# Patient Record
Sex: FEMALE | Race: WHITE | NOT HISPANIC OR LATINO | Employment: OTHER | URBAN - METROPOLITAN AREA
[De-identification: names, ages, dates, MRNs, and addresses within clinical notes are randomized per-mention and may not be internally consistent; named-entity substitution may affect disease eponyms.]

---

## 2018-02-21 ENCOUNTER — HOSPITAL ENCOUNTER (EMERGENCY)
Facility: HOSPITAL | Age: 43
Discharge: HOME | End: 2018-02-21
Attending: EMERGENCY MEDICINE
Payer: MEDICARE

## 2018-02-21 VITALS
BODY MASS INDEX: 31.96 KG/M2 | WEIGHT: 198 LBS | TEMPERATURE: 98 F | SYSTOLIC BLOOD PRESSURE: 105 MMHG | RESPIRATION RATE: 20 BRPM | OXYGEN SATURATION: 100 % | HEART RATE: 58 BPM | DIASTOLIC BLOOD PRESSURE: 57 MMHG

## 2018-02-21 DIAGNOSIS — S00.83XA CONTUSION OF FACE, INITIAL ENCOUNTER: Primary | ICD-10-CM

## 2018-02-21 PROCEDURE — 99284 EMERGENCY DEPT VISIT MOD MDM: CPT

## 2018-02-21 RX ORDER — HYDROCORTISONE 5 MG/1
5 TABLET ORAL DAILY
Status: ON HOLD | COMMUNITY
End: 2019-10-22

## 2018-02-21 RX ORDER — HALOPERIDOL 10 MG/1
10 TABLET ORAL 2 TIMES DAILY
Status: ON HOLD | COMMUNITY
End: 2019-10-23 | Stop reason: HOSPADM

## 2018-02-21 RX ORDER — HYDROCORTISONE 10 MG/1
10 TABLET ORAL DAILY
Status: ON HOLD | COMMUNITY
End: 2019-10-22

## 2018-02-21 RX ORDER — TRAZODONE HYDROCHLORIDE 100 MG/1
100 TABLET ORAL NIGHTLY
Status: ON HOLD | COMMUNITY
End: 2019-10-23 | Stop reason: HOSPADM

## 2018-02-21 RX ORDER — RISPERIDONE 1 MG/1
1.5 TABLET, ORALLY DISINTEGRATING ORAL DAILY
Status: ON HOLD | COMMUNITY
End: 2019-10-22 | Stop reason: SDUPTHER

## 2018-02-21 RX ORDER — LEVOTHYROXINE SODIUM 75 UG/1
75 TABLET ORAL DAILY
COMMUNITY

## 2018-02-21 RX ORDER — RISPERIDONE 2 MG/1
0.5 TABLET ORAL 2 TIMES DAILY
Status: ON HOLD | COMMUNITY
End: 2019-10-25 | Stop reason: SDUPTHER

## 2018-02-21 RX ORDER — LORAZEPAM 2 MG/1
2 TABLET ORAL NIGHTLY
Status: ON HOLD | COMMUNITY
End: 2019-10-22

## 2018-02-21 NOTE — DISCHARGE INSTRUCTIONS
__________________    No broken bones or other serious injury.    Routine ice packs/ Tylenol/ Motrin/ etc.    ______________

## 2018-02-21 NOTE — ED PROVIDER NOTES
Encounter Date: 2/21/2018       History     Chief Complaint   Patient presents with    Fall     fell in shower last night . left eye with bruising.     Lives at a care facility, generally has assistance with taking a shower, apparently took a shower on her own last night and slipped, striking her face.  There was no loss of consciousness.  No laceration or bleeding.  Night staff tended to her, and she did not have any other injury or complaints besides bruising and tenderness in the left infraorbital region.  Here today for further evaluation as per supervisor.  No change in behavior.  She has known mild strabismus which is at her baseline according to the caregiver.  She has moderate MR and other conditions as outlined, staff confirms that she is behaving normally.  No headache, neck pain, vomiting, change in level of consciousness, or other complaints.      The history is provided by the patient and a caregiver. No  was used.     Review of patient's allergies indicates:   Allergen Reactions    Tofranil [imipramine hcl]      Past Medical History:   Diagnosis Date    Autistic disorder     IDDM (insulin dependent diabetes mellitus)     Mental retardation     Tourette disease      History reviewed. No pertinent surgical history.  History reviewed. No pertinent family history.  Social History   Substance Use Topics    Smoking status: Never Smoker    Smokeless tobacco: Never Used    Alcohol use No     Review of Systems   Constitutional: Negative for activity change, fatigue and fever.   HENT: Positive for facial swelling. Negative for congestion, ear pain, nosebleeds, sinus pressure and sore throat.    Eyes: Negative for pain, discharge, redness and visual disturbance.   Respiratory: Negative for cough, choking, chest tightness, shortness of breath and wheezing.    Cardiovascular: Negative for chest pain, palpitations and leg swelling.   Gastrointestinal: Negative for abdominal distention,  abdominal pain, nausea and vomiting.   Endocrine: Negative for heat intolerance, polydipsia and polyuria.   Genitourinary: Negative for difficulty urinating, dysuria, flank pain, hematuria and urgency.   Musculoskeletal: Negative for back pain, gait problem, joint swelling and myalgias.   Skin: Negative for color change and rash.   Allergic/Immunologic: Negative for environmental allergies and food allergies.   Neurological: Negative for dizziness, weakness, numbness and headaches.   Hematological: Negative for adenopathy. Does not bruise/bleed easily.   Psychiatric/Behavioral: Negative for agitation and behavioral problems. The patient is not nervous/anxious.    All other systems reviewed and are negative.      Physical Exam     Initial Vitals [02/21/18 0926]   BP Pulse Resp Temp SpO2   (!) 105/57 (!) 58 20 98.2 °F (36.8 °C) 100 %      MAP       73         Physical Exam    Nursing note and vitals reviewed.  Constitutional: She appears well-developed and well-nourished. She is not diaphoretic. No distress.   HENT:   Head: Normocephalic.   Mouth/Throat: No oropharyngeal exudate.   Moderate tenderness, slight swelling, and ecchymosis in the left infraorbital region without adelita deformity.  Tympanic membranes and ear canals are clear.  Extraocular eye movements are intact, no sign of entrapment although she does have some mild strabismus which is at her baseline as per caregiver.  No other findings.   Eyes: Conjunctivae and EOM are normal. Pupils are equal, round, and reactive to light. Right eye exhibits no discharge. Left eye exhibits no discharge. No scleral icterus.   Mild strabismus at baseline per caregiver - left eye with mild left lateral deviation   Neck: Normal range of motion. Neck supple. No thyromegaly present. No tracheal deviation present. No JVD present.   Cardiovascular: Normal rate, regular rhythm, normal heart sounds and intact distal pulses. Exam reveals no gallop and no friction rub.    No murmur  heard.  Pulmonary/Chest: Breath sounds normal. No stridor. No respiratory distress. She has no wheezes. She has no rhonchi. She has no rales. She exhibits no tenderness.   Abdominal: Soft. Bowel sounds are normal. She exhibits no distension and no mass. There is no tenderness. There is no rebound and no guarding.   Musculoskeletal: Normal range of motion. She exhibits no edema or tenderness.   Neurological: She is alert and oriented to person, place, and time. She has normal strength.   Skin: Skin is warm and dry. No rash and no abscess noted. No erythema.   Psychiatric: She has a normal mood and affect. Her behavior is normal. Judgment and thought content normal.   Calm, flat, moderate MR, cooperative, non-localizing exam         ED Course   Procedures  Labs Reviewed - No data to display     Imaging Results          CT Head Without Contrast (Final result)  Result time 02/21/18 11:50:23    Final result by KELLEY Harrell Sr., MD (02/21/18 11:50:23)                 Impression:      1. There is no calvarial fracture or intracranial hemorrhage.  2. There is mild mucosal sinus thickening in the left maxillary sinus. It has a thickness of 3 mm.        All CT scans at this facility use dose modulation, iterative reconstruction, and/or weight base dosing when appropriate to reduce radiation dose when appropriate to reduce radiation dose to as low as reasonably achievable.      Electronically signed by: KELLEY HARRELL MD  Date:     02/21/18  Time:    11:50              Narrative:    CT of Head without IV contrast    History:     Headache status post trauma    Technique: Standard brain CT protocol without IV contrast was performed.     Finding: The ventricles have a normal size, position, and appearance. There is no abnormal intracranial mass or intracranial hemorrhage. There is no skull fracture. There is mild mucosal sinus thickening in the left maxillary sinus. It has a thickness of 3 mm.                             CT  Maxillofacial Without Contrast (Final result)  Result time 02/21/18 12:03:08    Final result by KELLEY Harrell Sr., MD (02/21/18 12:03:08)                 Impression:      1. There is a mild amount of edema in the soft tissue inferior to the left orbit. This is consistent with the patient's history and characteristic of a soft tissue contusion.  2. There is moderate deviation to the right of the nasal septum.   3. There is mild mucosal sinus thickening in the maxillary sinuses bilaterally. The thickening in the left maxillary sinus measures 5 mm.      All CT scans at this facility use dose modulation, iterative reconstruction, and/or weight base dosing when appropriate to reduce radiation dose when appropriate to reduce radiation dose to as low as reasonably achievable.      Electronically signed by: KELLEY HARRELL MD  Date:     02/21/18  Time:    12:03              Narrative:    CT of the facial bones without IV contrast    Clinical History: Facial pain status post trauma    Technique: Standard paranasal sinus CT protocol without IV contrast material was performed     Finding: There is a mild amount of edema in the soft tissue inferior to the left orbit. There is no fracture or dislocation. There is moderate deviation to the right of the nasal septum. The ostiomeatal complexes are patent bilaterally. There is mild mucosal sinus thickening in the maxillary sinuses bilaterally. The thickening in the left maxillary sinus measures 5 mm.                             CT Cervical Spine Without Contrast (Final result)  Result time 02/21/18 11:54:10    Final result by KELLEY Harrell Sr., MD (02/21/18 11:54:10)                 Impression:         There is a mild amount of levoconvex curvature of the cervical spine.        All CT scans at this facility use dose modulation, iterative reconstruction, and/or weight base dosing when appropriate to reduce radiation dose when appropriate to reduce radiation dose to as low as  reasonably achievable.      Electronically signed by: KELLEY QUIGLEY MD  Date:     02/21/18  Time:    11:54              Narrative:    CT of c-spine without IV contrast    History: Neck pain    Technique: Standard cervical spine CT protocol was performed without IV contrast.    Finding: There is a mild amount of levoconvex curvature of the cervical spine. There is no fracture or spondylolisthesis. There is normal cervical lordosis. The prevertebral soft tissue space is normal in appearance.                            12:11 PM Perfectly stable.                          Clinical Impression:     1. Contusion of face, initial encounter            Disposition:   Disposition: Discharged  Condition: Stable                        David Oviedo MD  02/21/18 9290

## 2018-02-22 ENCOUNTER — PES CALL (OUTPATIENT)
Dept: ADMINISTRATIVE | Facility: CLINIC | Age: 43
End: 2018-02-22

## 2019-10-19 ENCOUNTER — HOSPITAL ENCOUNTER (INPATIENT)
Facility: HOSPITAL | Age: 44
LOS: 4 days | Discharge: HOME HEALTH | DRG: 563 | End: 2019-10-25
Attending: EMERGENCY MEDICINE | Admitting: INTERNAL MEDICINE
Payer: MEDICARE

## 2019-10-19 DIAGNOSIS — T68.XXXA HYPOTHERMIA, INITIAL ENCOUNTER: ICD-10-CM

## 2019-10-19 DIAGNOSIS — R00.1 BRADYCARDIA: ICD-10-CM

## 2019-10-19 DIAGNOSIS — Z79.899 POLYPHARMACY: ICD-10-CM

## 2019-10-19 DIAGNOSIS — S42.292D OTHER CLOSED DISPLACED FRACTURE OF PROXIMAL END OF LEFT HUMERUS WITH ROUTINE HEALING, SUBSEQUENT ENCOUNTER: Primary | ICD-10-CM

## 2019-10-19 DIAGNOSIS — R41.82 ALTERED MENTAL STATUS, UNSPECIFIED ALTERED MENTAL STATUS TYPE: ICD-10-CM

## 2019-10-19 DIAGNOSIS — S42.292A OTHER CLOSED DISPLACED FRACTURE OF PROXIMAL END OF LEFT HUMERUS, INITIAL ENCOUNTER: ICD-10-CM
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
## 2019-10-19 DIAGNOSIS — M79.602 LEFT ARM PAIN: ICD-10-CM

## 2019-10-19 DIAGNOSIS — R53.83 LETHARGY: ICD-10-CM

## 2019-10-19 PROBLEM — D69.6 THROMBOCYTOPENIA: Status: ACTIVE | Noted: 2019-10-19

## 2019-10-19 PROBLEM — S42.402A CLOSED FRACTURE OF DISTAL END OF LEFT HUMERUS: Status: ACTIVE | Noted: 2019-10-19

## 2019-10-19 PROBLEM — S42.202A CLOSED FRACTURE OF PROXIMAL END OF LEFT HUMERUS: Status: ACTIVE | Noted: 2019-10-19

## 2019-10-19 PROBLEM — E03.9 HYPOTHYROIDISM: Status: ACTIVE | Noted: 2019-10-19

## 2019-10-19 LAB
ALBUMIN SERPL BCP-MCNC: 2.8 G/DL (ref 3.5–5.2)
ALP SERPL-CCNC: 78 U/L (ref 55–135)
ALT SERPL W/O P-5'-P-CCNC: 41 U/L (ref 10–44)
ANION GAP SERPL CALC-SCNC: 6 MMOL/L (ref 8–16)
AST SERPL-CCNC: 32 U/L (ref 10–40)
BASOPHILS # BLD AUTO: 0 K/UL (ref 0–0.2)
BASOPHILS # BLD AUTO: 0.01 K/UL (ref 0–0.2)
BASOPHILS NFR BLD: 0 % (ref 0–1.9)
BASOPHILS NFR BLD: 0.2 % (ref 0–1.9)
BILIRUB SERPL-MCNC: 0.3 MG/DL (ref 0.1–1)
BILIRUB UR QL STRIP: NEGATIVE
BUN SERPL-MCNC: 27 MG/DL (ref 6–20)
CALCIUM SERPL-MCNC: 9.2 MG/DL (ref 8.7–10.5)
CHLORIDE SERPL-SCNC: 112 MMOL/L (ref 95–110)
CLARITY UR: CLEAR
CO2 SERPL-SCNC: 25 MMOL/L (ref 23–29)
COLOR UR: YELLOW
CREAT SERPL-MCNC: 0.6 MG/DL (ref 0.5–1.4)
DIFFERENTIAL METHOD: ABNORMAL
DIFFERENTIAL METHOD: ABNORMAL
EOSINOPHIL # BLD AUTO: 0 K/UL (ref 0–0.5)
EOSINOPHIL # BLD AUTO: 0 K/UL (ref 0–0.5)
EOSINOPHIL NFR BLD: 0.4 % (ref 0–8)
EOSINOPHIL NFR BLD: 0.7 % (ref 0–8)
ERYTHROCYTE [DISTWIDTH] IN BLOOD BY AUTOMATED COUNT: 13.5 % (ref 11.5–14.5)
ERYTHROCYTE [DISTWIDTH] IN BLOOD BY AUTOMATED COUNT: 13.8 % (ref 11.5–14.5)
EST. GFR  (AFRICAN AMERICAN): >60 ML/MIN/1.73 M^2
EST. GFR  (NON AFRICAN AMERICAN): >60 ML/MIN/1.73 M^2
GLUCOSE SERPL-MCNC: 127 MG/DL (ref 70–110)
GLUCOSE UR QL STRIP: NEGATIVE
HCT VFR BLD AUTO: 24.3 % (ref 37–48.5)
HCT VFR BLD AUTO: 30.9 % (ref 37–48.5)
HGB BLD-MCNC: 10 G/DL (ref 12–16)
HGB BLD-MCNC: 7.8 G/DL (ref 12–16)
HGB UR QL STRIP: NEGATIVE
IMM GRANULOCYTES # BLD AUTO: 0.01 K/UL (ref 0–0.04)
IMM GRANULOCYTES # BLD AUTO: 0.02 K/UL (ref 0–0.04)
IMM GRANULOCYTES NFR BLD AUTO: 0.2 % (ref 0–0.5)
IMM GRANULOCYTES NFR BLD AUTO: 0.5 % (ref 0–0.5)
KETONES UR QL STRIP: ABNORMAL
LACTATE SERPL-SCNC: 0.6 MMOL/L (ref 0.5–2.2)
LEUKOCYTE ESTERASE UR QL STRIP: NEGATIVE
LYMPHOCYTES # BLD AUTO: 0.9 K/UL (ref 1–4.8)
LYMPHOCYTES # BLD AUTO: 1 K/UL (ref 1–4.8)
LYMPHOCYTES NFR BLD: 18.8 % (ref 18–48)
LYMPHOCYTES NFR BLD: 22 % (ref 18–48)
MCH RBC QN AUTO: 31.3 PG (ref 27–31)
MCH RBC QN AUTO: 31.9 PG (ref 27–31)
MCHC RBC AUTO-ENTMCNC: 32.1 G/DL (ref 32–36)
MCHC RBC AUTO-ENTMCNC: 32.4 G/DL (ref 32–36)
MCV RBC AUTO: 98 FL (ref 82–98)
MCV RBC AUTO: 99 FL (ref 82–98)
MONOCYTES # BLD AUTO: 0.4 K/UL (ref 0.3–1)
MONOCYTES # BLD AUTO: 0.4 K/UL (ref 0.3–1)
MONOCYTES NFR BLD: 7.1 % (ref 4–15)
MONOCYTES NFR BLD: 9.4 % (ref 4–15)
NEUTROPHILS # BLD AUTO: 2.8 K/UL (ref 1.8–7.7)
NEUTROPHILS # BLD AUTO: 3.7 K/UL (ref 1.8–7.7)
NEUTROPHILS NFR BLD: 67.4 % (ref 38–73)
NEUTROPHILS NFR BLD: 73.3 % (ref 38–73)
NITRITE UR QL STRIP: NEGATIVE
NRBC BLD-RTO: 0 /100 WBC
NRBC BLD-RTO: 0 /100 WBC
PH UR STRIP: 6 [PH] (ref 5–8)
PLATELET # BLD AUTO: 77 K/UL (ref 150–350)
PLATELET # BLD AUTO: 88 K/UL (ref 150–350)
PMV BLD AUTO: 11.1 FL (ref 9.2–12.9)
PMV BLD AUTO: 11.3 FL (ref 9.2–12.9)
POTASSIUM SERPL-SCNC: 3.9 MMOL/L (ref 3.5–5.1)
PROT SERPL-MCNC: 5.4 G/DL (ref 6–8.4)
PROT UR QL STRIP: NEGATIVE
RBC # BLD AUTO: 2.49 M/UL (ref 4–5.4)
RBC # BLD AUTO: 3.13 M/UL (ref 4–5.4)
SODIUM SERPL-SCNC: 143 MMOL/L (ref 136–145)
SP GR UR STRIP: 1.02 (ref 1–1.03)
TROPONIN I SERPL DL<=0.01 NG/ML-MCNC: <0.006 NG/ML (ref 0–0.03)
TSH SERPL DL<=0.005 MIU/L-ACNC: 1.85 UIU/ML (ref 0.4–4)
URN SPEC COLLECT METH UR: ABNORMAL
UROBILINOGEN UR STRIP-ACNC: NEGATIVE EU/DL
WBC # BLD AUTO: 4.14 K/UL (ref 3.9–12.7)
WBC # BLD AUTO: 5.06 K/UL (ref 3.9–12.7)

## 2019-10-19 PROCEDURE — 63600175 PHARM REV CODE 636 W HCPCS: Performed by: NURSE PRACTITIONER

## 2019-10-19 PROCEDURE — 85610 PROTHROMBIN TIME: CPT

## 2019-10-19 PROCEDURE — G0378 HOSPITAL OBSERVATION PER HR: HCPCS

## 2019-10-19 PROCEDURE — 99285 EMERGENCY DEPT VISIT HI MDM: CPT | Mod: 25

## 2019-10-19 PROCEDURE — 84443 ASSAY THYROID STIM HORMONE: CPT

## 2019-10-19 PROCEDURE — 80053 COMPREHEN METABOLIC PANEL: CPT

## 2019-10-19 PROCEDURE — 93010 ELECTROCARDIOGRAM REPORT: CPT | Mod: ,,, | Performed by: INTERNAL MEDICINE

## 2019-10-19 PROCEDURE — 93005 ELECTROCARDIOGRAM TRACING: CPT

## 2019-10-19 PROCEDURE — 96361 HYDRATE IV INFUSION ADD-ON: CPT

## 2019-10-19 PROCEDURE — 84484 ASSAY OF TROPONIN QUANT: CPT

## 2019-10-19 PROCEDURE — 83605 ASSAY OF LACTIC ACID: CPT

## 2019-10-19 PROCEDURE — 36415 COLL VENOUS BLD VENIPUNCTURE: CPT

## 2019-10-19 PROCEDURE — 96360 HYDRATION IV INFUSION INIT: CPT

## 2019-10-19 PROCEDURE — 63600175 PHARM REV CODE 636 W HCPCS: Performed by: EMERGENCY MEDICINE

## 2019-10-19 PROCEDURE — 85025 COMPLETE CBC W/AUTO DIFF WBC: CPT

## 2019-10-19 PROCEDURE — 51702 INSERT TEMP BLADDER CATH: CPT

## 2019-10-19 PROCEDURE — 93010 EKG 12-LEAD: ICD-10-PCS | Mod: ,,, | Performed by: INTERNAL MEDICINE

## 2019-10-19 PROCEDURE — 81003 URINALYSIS AUTO W/O SCOPE: CPT

## 2019-10-19 RX ORDER — SODIUM CHLORIDE 9 MG/ML
INJECTION, SOLUTION INTRAVENOUS CONTINUOUS
Status: DISCONTINUED | OUTPATIENT
Start: 2019-10-19 | End: 2019-10-20

## 2019-10-19 RX ORDER — LEVOTHYROXINE SODIUM 75 UG/1
75 TABLET ORAL
Status: DISCONTINUED | OUTPATIENT
Start: 2019-10-20 | End: 2019-10-25 | Stop reason: HOSPADM

## 2019-10-19 RX ADMIN — SODIUM CHLORIDE: 0.9 INJECTION, SOLUTION INTRAVENOUS at 06:10

## 2019-10-19 RX ADMIN — SODIUM CHLORIDE 500 ML: 0.9 INJECTION, SOLUTION INTRAVENOUS at 11:10

## 2019-10-19 RX ADMIN — SODIUM CHLORIDE 1000 ML: 0.9 INJECTION, SOLUTION INTRAVENOUS at 11:10

## 2019-10-19 NOTE — H&P
[facility] Hospital Medicine  History & Physical    Patient Name: Suzie Fofana  MRN: [id number]  Admission Date: 10/19/2019  Attending Physician: Juan A Terrell, *   Primary Care Provider: Jim Cerda MD         Patient information was obtained from relative(s) and ER records.     Subjective:     Principal Problem:Altered mental status    Chief Complaint:   Chief Complaint   Patient presents with    Hypotension     patient found on the floor at her group home and hypotensive         HPI: Ms Fofana is a 44 year old female with PMHx of mental retardation, autism, DM and hypothyroid who presented to [facility] Emergency Room after being found on the floor after experiencing a fall. She lives in a group home and was found on the floor. EMS was notified, patient was hypotensive upon there arrivial with a blood pressure of 80/50, she was given IVF and blood pressure increase to 104/70. CT of the head showed no acute finding. Left arm x ray showed displaced angulated segmental fracture of the proximal left humerus and avulsion of the greater tuberosity. She has also been hypothermic in the Emergency Room with bladder temp of 93.5. Blood pressure continues to be on the low side. She is being placed in Observation Under the care of Hospital Medicine. Patient mother is a bedside and assistance in answer question.     Past Medical History:   Diagnosis Date    Autistic disorder     IDDM (insulin dependent diabetes mellitus)     Mental retardation     Thyroid disease     Tourette disease        Past Surgical History:   Procedure Laterality Date    EYE SURGERY      MOUTH SURGERY         Review of patient's allergies indicates:   Allergen Reactions    Tofranil [imipramine hcl]        No current facility-administered medications on file prior to encounter.      Current Outpatient Medications on File Prior to Encounter   Medication Sig    benztropine (COGENTIN) 0.5 MG tablet Take 0.5 mg by mouth  2 (two) times daily.    citalopram (CELEXA) 20 MG tablet Take 20 mg by mouth once daily.    desmopressin (DDAVP) 0.2 MG tablet Take 0.2 mg by mouth once daily.    divalproex (DEPAKOTE) 125 MG EC tablet Take 125 mg by mouth 3 (three) times daily.    fluphenazine (PROLIXIN) 10 MG tablet Take 10 mg by mouth once daily.    haloperidol (HALDOL) 10 MG tablet Take 10 mg by mouth 4 (four) times daily.    hydrocortisone (CORTEF) 10 MG Tab Take 10 mg by mouth once daily.    hydrocortisone (CORTEF) 5 MG Tab Take 5 mg by mouth once daily.    lactobacillus acidophilus & bulgar (LACTINEX) 100 million cell packet Take 1 tablet by mouth 2 (two) times daily.    levothyroxine (SYNTHROID) 75 MCG tablet Take 75 mcg by mouth once daily.    lorazepam (ATIVAN) 1 MG tablet Take 1 mg by mouth once daily.     LORazepam (ATIVAN) 2 MG Tab Take 2 mg by mouth every evening.    medroxyPROGESTERone (DEPO-PROVERA) 150 mg/mL injection Inject 150 mg into the muscle every 3 (three) months.    MULTIVIT-MINERALS/FERROUS GLUC (CEROVITE ORAL) Take by mouth.    prazosin (MINIPRESS) 1 MG Cap Take 1 mg by mouth 2 (two) times daily.    quetiapine (SEROQUEL) 200 MG Tab Take 50 mg by mouth once daily.     risperiDONE (RISPERDAL M-TABS) 1 MG TbDL Take 1.5 mg by mouth once daily.    risperiDONE (RISPERDAL) 2 MG tablet Take 2 mg by mouth every evening.    topiramate (TOPAMAX) 100 MG tablet Take 50 mg by mouth 2 (two) times daily.     traZODone (DESYREL) 100 MG tablet Take 100 mg by mouth every evening.     Family History     Problem Relation (Age of Onset)    Hearing loss Mother    Heart disease Father        Tobacco Use    Smoking status: Never Smoker    Smokeless tobacco: Never Used   Substance and Sexual Activity    Alcohol use: No    Drug use: No    Sexual activity: Not on file     Review of Systems   Constitutional: Positive for activity change and fatigue. Negative for chills and fever.   HENT: Negative for congestion, rhinorrhea and  sinus pressure.    Respiratory: Negative for apnea, cough, choking, chest tightness, shortness of breath, wheezing and stridor.    Cardiovascular: Negative for chest pain, palpitations and leg swelling.   Gastrointestinal: Negative for abdominal distention, abdominal pain, diarrhea, nausea and vomiting.   Endocrine: Negative for cold intolerance and heat intolerance.   Genitourinary: Negative for difficulty urinating and hematuria.   Musculoskeletal: Negative for arthralgias and joint swelling.        Lt arm pain    Skin: Negative for color change, pallor and rash.   Neurological: Positive for weakness. Negative for dizziness, seizures, numbness and headaches.   Psychiatric/Behavioral: Negative for agitation. The patient is not nervous/anxious.      Objective:     Vital Signs (Most Recent):  Temp: (!) 94.1 °F (34.5 °C) (10/19/19 1737)  Pulse: (!) 55 (10/19/19 1737)  Resp: 16 (10/19/19 1737)  BP: (!) 121/58 (10/19/19 1737)  SpO2: 100 % (10/19/19 1737) Vital Signs (24h Range):  Temp:  [92.5 °F (33.6 °C)-98 °F (36.7 °C)] 94.1 °F (34.5 °C)  Pulse:  [43-60] 55  Resp:  [15-23] 16  SpO2:  [98 %-100 %] 100 %  BP: ()/(50-70) 121/58     Weight: 90.8 kg (200 lb 3.2 oz)  Body mass index is 36.62 kg/m².    Physical Exam   Constitutional: She is oriented to person, place, and time. She has a sickly appearance. She appears ill.   HENT:   Mouth/Throat: No oropharyngeal exudate.   Eyes: Right eye exhibits no discharge. Left eye exhibits no discharge.   Neck: No JVD present.   Cardiovascular: Bradycardia present. Exam reveals no gallop and no friction rub.   No murmur heard.  Pulmonary/Chest: No stridor. No respiratory distress. She has no wheezes. She has no rales. She exhibits no tenderness.   Abdominal: She exhibits no distension and no mass. There is no tenderness. There is no rebound and no guarding. No hernia.   Musculoskeletal: She exhibits no edema or deformity.   Lt arm in sling    Neurological: She is alert and  oriented to person, place, and time. She displays normal reflexes. No cranial nerve deficit or sensory deficit. She exhibits normal muscle tone. Coordination normal.   Skin: Skin is warm and dry. Capillary refill takes less than 2 seconds. No erythema. There is pallor.   Nursing note and vitals reviewed.          Significant Labs:   CBC:   Recent Labs   Lab 10/19/19  1124   WBC 5.06   HGB 10.0*   HCT 30.9*   PLT 77*     CMP:   Recent Labs   Lab 10/19/19  1124      K 3.9   *   CO2 25   *   BUN 27*   CREATININE 0.6   CALCIUM 9.2   PROT 5.4*   ALBUMIN 2.8*   BILITOT 0.3   ALKPHOS 78   AST 32   ALT 41   ANIONGAP 6*   EGFRNONAA >60       Significant Imaging:     Imaging Results          X-Ray Chest AP Portable (Final result)  Result time 10/19/19 17:28:25    Final result by Justo Pendleton MD (10/19/19 17:28:25)                 Impression:      1.  Low lung volumes with vascular crowding or atelectasis in the lung bases.  A function of interstitial pulmonary edema or interstitial infectious process difficult to exclude in the right clinical setting    2.  Incidental findings as noted above.      Electronically signed by: Justo Pendleton MD  Date:    10/19/2019  Time:    17:28             Narrative:    EXAMINATION:  XR CHEST AP PORTABLE    CLINICAL HISTORY:  Transient alteration of awareness;    COMPARISON:  No comparison studies are available.    FINDINGS:  EKG leads overlie the chest which is rotated to the right.  Low lung volumes, with vascular crowding or atelectasis in the lung bases.  The lungs are otherwise clear.  The cardiac silhouette size is normal. The trachea is midline and the mediastinal width is normal. Negative for focal infiltrate, effusion or pneumothorax. Pulmonary vasculature is normal. Negative for osseous abnormalities. Tortuous aorta.  Convex right curvature of the thoracolumbar junction with marginal spondylosis.                               CT Head Without Contrast (Final  result)  Result time 10/19/19 15:25:24    Final result by Braden Zimmerman Jr., MD (10/19/19 15:25:24)                 Impression:      1. No acute intracranial findings.  All CT scans at this facility are performed  using dose modulation techniques as appropriate to performed exam including the following:  automated exposure control; adjustment of mA and/or kV according to the patients size (this includes techniques or standardized protocols for targeted exams where dose is matched to indication/reason for exam: i.e. extremities or head);  iterative reconstruction technique.      Electronically signed by: Braden Zimmerman Jr., MD  Date:    10/19/2019  Time:    15:25             Narrative:    EXAMINATION:  CT HEAD WITHOUT CONTRAST    CLINICAL HISTORY:  Confusion/delirium, altered LOC, unexplained;    TECHNIQUE:  CT scan was obtained of the head without administration of contrast.    COMPARISON:  02/21/2018    FINDINGS:  Ventricles and basal cisterns are normal.  No hemorrhage, mass effect or midline shift.  No cerebral or cerebellar parenchymal abnormality.  Paranasal sinuses are clear.  Mastoid air cells are clear.  Calvarium is intact.                               X-Ray Pelvis Routine AP (Final result)  Result time 10/19/19 12:01:38    Final result by Braden Zimmerman Jr., MD (10/19/19 12:01:38)                 Impression:      1.  No acute fracture or dislocation pelvis.      Electronically signed by: Braden Zimmerman Jr., MD  Date:    10/19/2019  Time:    12:01             Narrative:    EXAMINATION:  XR PELVIS ROUTINE AP    COMPARISON:  None    FINDINGS:  The bones, joint spaces and soft tissues are within normal limits.  No acute fracture or dislocation.                               X-Ray Humerus 2 View Bilateral (Final result)  Result time 10/19/19 12:00:27   Procedure changed from X-Ray Humerus 2 View Left     Final result by Braden Zimmerman Jr., MD (10/19/19 12:00:27)                 Impression:      1.   Displaced angulated segmental fracture of the proximal left humerus and avulsion of the greater tuberosity.      Electronically signed by: Braden Zimmerman Jr., MD  Date:    10/19/2019  Time:    12:00             Narrative:    EXAMINATION:  XR HUMERUS 2 VIEW BILATERAL    CLINICAL HISTORY:  pain;Pain in left arm    COMPARISON:  None    FINDINGS:  Displaced angulated segmental proximal 3rd left humerus fracture involving the neck and proximal diaphysis.  Soft tissue swelling of the left arm.  Avulsion of the greater tuberosity.  Right humerus is intact.                              Assessment/Plan:     * Altered mental status  Place in observation   CT of head showed no acute finding   Neuro checks   IVF  Patient on multiple psyc medications, may be over medicated, will hold for now   UDS   Telemetry monitor   History of MR and Autism,  evaluation       Fall  As above       Thrombocytopenia  Platelet count 77  No signs of bleeding   Monitor       Closed fracture of proximal end of left humerus  Orthopedic Surgery Consult       Bradycardia  HR in the 50's   Telemetry monitor   Check troponin   Monitor for now       Hypothermia  Temp 93.5 in the Emergency Room   No current source of infection   Lactic acid   Chest X ray  Warming blanket  monitor        Hypothyroidism  Continue Synthroid       VTE Risk Mitigation (From admission, onward)         Ordered     Place sequential compression device  Until discontinued      10/19/19 1811                   Manuelito Zavala NP  Department of Hospital Medicine   [facility]

## 2019-10-19 NOTE — HPI
Ms Fofana is a 44 year old female with PMHx of mental retardation, autism, DM and hypothyroid who presented to [facility] Emergency Room after being found on the floor after experiencing a fall. She lives in a group home and was found on the floor. EMS was notified, patient was hypotensive upon there arrivial with a blood pressure of 80/50, she was given IVF and blood pressure increase to 104/70. CT of the head showed no acute finding. Left arm x ray showed displaced angulated segmental fracture of the proximal left humerus and avulsion of the greater tuberosity. She has also been hypothermic in the Emergency Room with bladder temp of 93.5. Blood pressure continues to be on the low side. She is being placed in Observation Under the care of Hospital Medicine. Patient mother is a bedside and assistance in answer question.

## 2019-10-19 NOTE — SUBJECTIVE & OBJECTIVE
Past Medical History:   Diagnosis Date    Autistic disorder     IDDM (insulin dependent diabetes mellitus)     Mental retardation     Thyroid disease     Tourette disease        Past Surgical History:   Procedure Laterality Date    EYE SURGERY      MOUTH SURGERY         Review of patient's allergies indicates:   Allergen Reactions    Tofranil [imipramine hcl]        No current facility-administered medications on file prior to encounter.      Current Outpatient Medications on File Prior to Encounter   Medication Sig    benztropine (COGENTIN) 0.5 MG tablet Take 0.5 mg by mouth 2 (two) times daily.    citalopram (CELEXA) 20 MG tablet Take 20 mg by mouth once daily.    desmopressin (DDAVP) 0.2 MG tablet Take 0.2 mg by mouth once daily.    divalproex (DEPAKOTE) 125 MG EC tablet Take 125 mg by mouth 3 (three) times daily.    fluphenazine (PROLIXIN) 10 MG tablet Take 10 mg by mouth once daily.    haloperidol (HALDOL) 10 MG tablet Take 10 mg by mouth 4 (four) times daily.    hydrocortisone (CORTEF) 10 MG Tab Take 10 mg by mouth once daily.    hydrocortisone (CORTEF) 5 MG Tab Take 5 mg by mouth once daily.    lactobacillus acidophilus & bulgar (LACTINEX) 100 million cell packet Take 1 tablet by mouth 2 (two) times daily.    levothyroxine (SYNTHROID) 75 MCG tablet Take 75 mcg by mouth once daily.    lorazepam (ATIVAN) 1 MG tablet Take 1 mg by mouth once daily.     LORazepam (ATIVAN) 2 MG Tab Take 2 mg by mouth every evening.    medroxyPROGESTERone (DEPO-PROVERA) 150 mg/mL injection Inject 150 mg into the muscle every 3 (three) months.    MULTIVIT-MINERALS/FERROUS GLUC (CEROVITE ORAL) Take by mouth.    prazosin (MINIPRESS) 1 MG Cap Take 1 mg by mouth 2 (two) times daily.    quetiapine (SEROQUEL) 200 MG Tab Take 50 mg by mouth once daily.     risperiDONE (RISPERDAL M-TABS) 1 MG TbDL Take 1.5 mg by mouth once daily.    risperiDONE (RISPERDAL) 2 MG tablet Take 2 mg by mouth every evening.    topiramate  (TOPAMAX) 100 MG tablet Take 50 mg by mouth 2 (two) times daily.     traZODone (DESYREL) 100 MG tablet Take 100 mg by mouth every evening.     Family History     Problem Relation (Age of Onset)    Hearing loss Mother    Heart disease Father        Tobacco Use    Smoking status: Never Smoker    Smokeless tobacco: Never Used   Substance and Sexual Activity    Alcohol use: No    Drug use: No    Sexual activity: Not on file     Review of Systems   Constitutional: Positive for activity change and fatigue. Negative for chills and fever.   HENT: Negative for congestion, rhinorrhea and sinus pressure.    Respiratory: Negative for apnea, cough, choking, chest tightness, shortness of breath, wheezing and stridor.    Cardiovascular: Negative for chest pain, palpitations and leg swelling.   Gastrointestinal: Negative for abdominal distention, abdominal pain, diarrhea, nausea and vomiting.   Endocrine: Negative for cold intolerance and heat intolerance.   Genitourinary: Negative for difficulty urinating and hematuria.   Musculoskeletal: Negative for arthralgias and joint swelling.        Lt arm pain    Skin: Negative for color change, pallor and rash.   Neurological: Positive for weakness. Negative for dizziness, seizures, numbness and headaches.   Psychiatric/Behavioral: Negative for agitation. The patient is not nervous/anxious.      Objective:     Vital Signs (Most Recent):  Temp: (!) 94.1 °F (34.5 °C) (10/19/19 1737)  Pulse: (!) 55 (10/19/19 1737)  Resp: 16 (10/19/19 1737)  BP: (!) 121/58 (10/19/19 1737)  SpO2: 100 % (10/19/19 1737) Vital Signs (24h Range):  Temp:  [92.5 °F (33.6 °C)-98 °F (36.7 °C)] 94.1 °F (34.5 °C)  Pulse:  [43-60] 55  Resp:  [15-23] 16  SpO2:  [98 %-100 %] 100 %  BP: ()/(50-70) 121/58     Weight: 90.8 kg (200 lb 3.2 oz)  Body mass index is 36.62 kg/m².    Physical Exam   Constitutional: She is oriented to person, place, and time. She has a sickly appearance. She appears ill.   HENT:    Mouth/Throat: No oropharyngeal exudate.   Eyes: Right eye exhibits no discharge. Left eye exhibits no discharge.   Neck: No JVD present.   Cardiovascular: Bradycardia present. Exam reveals no gallop and no friction rub.   No murmur heard.  Pulmonary/Chest: No stridor. No respiratory distress. She has no wheezes. She has no rales. She exhibits no tenderness.   Abdominal: She exhibits no distension and no mass. There is no tenderness. There is no rebound and no guarding. No hernia.   Musculoskeletal: She exhibits no edema or deformity.   Lt arm in sling    Neurological: She is alert and oriented to person, place, and time. She displays normal reflexes. No cranial nerve deficit or sensory deficit. She exhibits normal muscle tone. Coordination normal.   Skin: Skin is warm and dry. Capillary refill takes less than 2 seconds. No erythema. There is pallor.   Nursing note and vitals reviewed.          Significant Labs:   CBC:   Recent Labs   Lab 10/19/19  1124   WBC 5.06   HGB 10.0*   HCT 30.9*   PLT 77*     CMP:   Recent Labs   Lab 10/19/19  1124      K 3.9   *   CO2 25   *   BUN 27*   CREATININE 0.6   CALCIUM 9.2   PROT 5.4*   ALBUMIN 2.8*   BILITOT 0.3   ALKPHOS 78   AST 32   ALT 41   ANIONGAP 6*   EGFRNONAA >60       Significant Imaging:     Imaging Results          X-Ray Chest AP Portable (Final result)  Result time 10/19/19 17:28:25    Final result by Justo Pendleton MD (10/19/19 17:28:25)                 Impression:      1.  Low lung volumes with vascular crowding or atelectasis in the lung bases.  A function of interstitial pulmonary edema or interstitial infectious process difficult to exclude in the right clinical setting    2.  Incidental findings as noted above.      Electronically signed by: Justo Pendleton MD  Date:    10/19/2019  Time:    17:28             Narrative:    EXAMINATION:  XR CHEST AP PORTABLE    CLINICAL HISTORY:  Transient alteration of awareness;    COMPARISON:  No comparison  studies are available.    FINDINGS:  EKG leads overlie the chest which is rotated to the right.  Low lung volumes, with vascular crowding or atelectasis in the lung bases.  The lungs are otherwise clear.  The cardiac silhouette size is normal. The trachea is midline and the mediastinal width is normal. Negative for focal infiltrate, effusion or pneumothorax. Pulmonary vasculature is normal. Negative for osseous abnormalities. Tortuous aorta.  Convex right curvature of the thoracolumbar junction with marginal spondylosis.                               CT Head Without Contrast (Final result)  Result time 10/19/19 15:25:24    Final result by Braden Zimmerman Jr., MD (10/19/19 15:25:24)                 Impression:      1. No acute intracranial findings.  All CT scans at this facility are performed  using dose modulation techniques as appropriate to performed exam including the following:  automated exposure control; adjustment of mA and/or kV according to the patients size (this includes techniques or standardized protocols for targeted exams where dose is matched to indication/reason for exam: i.e. extremities or head);  iterative reconstruction technique.      Electronically signed by: Braden Zimmerman Jr., MD  Date:    10/19/2019  Time:    15:25             Narrative:    EXAMINATION:  CT HEAD WITHOUT CONTRAST    CLINICAL HISTORY:  Confusion/delirium, altered LOC, unexplained;    TECHNIQUE:  CT scan was obtained of the head without administration of contrast.    COMPARISON:  02/21/2018    FINDINGS:  Ventricles and basal cisterns are normal.  No hemorrhage, mass effect or midline shift.  No cerebral or cerebellar parenchymal abnormality.  Paranasal sinuses are clear.  Mastoid air cells are clear.  Calvarium is intact.                               X-Ray Pelvis Routine AP (Final result)  Result time 10/19/19 12:01:38    Final result by Braden Zimmerman Jr., MD (10/19/19 12:01:38)                 Impression:      1.  No  acute fracture or dislocation pelvis.      Electronically signed by: Braden Zimmerman Jr., MD  Date:    10/19/2019  Time:    12:01             Narrative:    EXAMINATION:  XR PELVIS ROUTINE AP    COMPARISON:  None    FINDINGS:  The bones, joint spaces and soft tissues are within normal limits.  No acute fracture or dislocation.                               X-Ray Humerus 2 View Bilateral (Final result)  Result time 10/19/19 12:00:27   Procedure changed from X-Ray Humerus 2 View Left     Final result by Braden Zimmerman Jr., MD (10/19/19 12:00:27)                 Impression:      1.  Displaced angulated segmental fracture of the proximal left humerus and avulsion of the greater tuberosity.      Electronically signed by: Braden Zimmerman Jr., MD  Date:    10/19/2019  Time:    12:00             Narrative:    EXAMINATION:  XR HUMERUS 2 VIEW BILATERAL    CLINICAL HISTORY:  pain;Pain in left arm    COMPARISON:  None    FINDINGS:  Displaced angulated segmental proximal 3rd left humerus fracture involving the neck and proximal diaphysis.  Soft tissue swelling of the left arm.  Avulsion of the greater tuberosity.  Right humerus is intact.

## 2019-10-19 NOTE — ED NOTES
Dr. Hernandez aware of patient's temperature. Family states this happens a lot. Per Dr. Hernandez, place indwelling criticore meyer catheter and place patient under warming blanket.

## 2019-10-19 NOTE — ED PROVIDER NOTES
SCRIBE #1 NOTE: I, Fidencio Mccoy, am scribing for, and in the presence of, Palomo Hernandez MD. I have scribed the entire note.       History     Chief Complaint   Patient presents with    Hypotension     patient found on the floor at her group home and hypotensive      Review of patient's allergies indicates:   Allergen Reactions    Tofranil [imipramine hcl]          History of Present Illness     HPI    10/19/2019, 10:32 AM  History obtained from the patient, caretaker, and sister      History of Present Illness: Suzie Fofana is a 44 y.o. female patient with a PMHx of autism, DM, who presents to the Emergency Department for evaluation of fall which onset this morning. Pt lives at a group home and was found on the floor this morning, at which point EMS was called. When EMS arrived, they measured her blood pressure to be 80/50. After being given fluids, Pt's blood pressure was 104/70. Symptoms are constant and moderate in severity. No mitigating or exacerbating factors reported. Associated sxs include visual disturbance (chronic), LUE bruising, and a HA. Patient and other historians deny any back pain, abominal pain, CP, SOB, fever, chills, vomiting, and all other sxs at this time. No prior Tx reported. No further complaints or concerns at this time.         Arrival mode: EMS    PCP: Jim Cerda MD        Past Medical History:  Past Medical History:   Diagnosis Date    Autistic disorder     IDDM (insulin dependent diabetes mellitus)     Mental retardation     Tourette disease        Past Surgical History:  History reviewed. No pertinent surgical history.      Family History:  History reviewed. No pertinent family history.    Social History:  Social History     Tobacco Use    Smoking status: Never Smoker    Smokeless tobacco: Never Used   Substance and Sexual Activity    Alcohol use: No    Drug use: No    Sexual activity: Not on file        Review of Systems     Review of Systems   Constitutional:  "Negative for chills and fever.   Eyes: Positive for visual disturbance (Chronic).   Respiratory: Negative for shortness of breath.    Cardiovascular: Negative for chest pain.   Gastrointestinal: Negative for abdominal pain and vomiting.   Musculoskeletal: Negative for back pain.   Skin: Positive for wound (LUE bruising).   Neurological: Positive for headaches.      Physical Exam     Initial Vitals [10/19/19 0945]   BP Pulse Resp Temp SpO2   104/70 60 18 98 °F (36.7 °C) 100 %      MAP       --          Physical Exam  Nursing Notes and Vital Signs Reviewed.  Constitutional: Patient is in no acute distress. Well-developed and well-nourished.  Head: Atraumatic. Normocephalic.  Eyes: PERRL. EOM intact. Conjunctivae are not pale. No scleral icterus.  ENT: Mucous membranes are moist. Oropharynx is clear and symmetric.    Neck: Supple. Full ROM. No lymphadenopathy.  Cardiovascular: Regular rate. Regular rhythm. No murmurs, rubs, or gallops. Distal pulses are 2+ and symmetric.  Pulmonary/Chest: No respiratory distress. Clear to auscultation bilaterally. No wheezing or rales.  Abdominal: Soft and non-distended.  There is no tenderness.  No rebound, guarding, or rigidity. Good bowel sounds.  Genitourinary: No CVA tenderness  Musculoskeletal: LUE:  Left proximal humoral ecchymosis, swelling, and tenderness. NVI distally.   Skin: Warm and dry.  Neurological: Lethargic, but responsive       ED Course   Procedures  ED Vital Signs:  Vitals:    10/19/19 0945 10/19/19 1003 10/19/19 1004 10/19/19 1020   BP: 104/70 (!) 97/56     Pulse: 60   (!) 54   Resp: 18      Temp: 98 °F (36.7 °C)      TempSrc: Oral      SpO2: 100%      Weight:       Height:   5' 2" (1.575 m)     10/19/19 1117 10/19/19 1140 10/19/19 1226 10/19/19 1359   BP: (!) 99/50  (!) 94/51    Pulse: (!) 53      Resp: 20      Temp:  97.6 °F (36.4 °C)  (!) 92.5 °F (33.6 °C)   TempSrc:  Oral  Rectal   SpO2: 100%      Weight:       Height:        10/19/19 1446 10/19/19 1524 " 10/19/19 1553 10/19/19 1626   BP: (!) 95/55 (!) 99/57     Pulse: (!) 43 (!) 51     Resp: (!) 23 15     Temp:   (!) 93.2 °F (34 °C)    TempSrc:   Core Bladder    SpO2: 99% 98%     Weight:    90.8 kg (200 lb 3.2 oz)   Height:        10/19/19 1630 10/19/19 1635   BP:  (!) 109/52   Pulse: (!) 55 (!) 56   Resp: 18 19   Temp: (!) 93.5 °F (34.2 °C)    TempSrc: Core Bladder    SpO2:  100%   Weight:     Height:         Abnormal Lab Results:  Labs Reviewed   COMPREHENSIVE METABOLIC PANEL - Abnormal; Notable for the following components:       Result Value    Chloride 112 (*)     Glucose 127 (*)     BUN, Bld 27 (*)     Total Protein 5.4 (*)     Albumin 2.8 (*)     Anion Gap 6 (*)     All other components within normal limits   CBC W/ AUTO DIFFERENTIAL - Abnormal; Notable for the following components:    RBC 3.13 (*)     Hemoglobin 10.0 (*)     Hematocrit 30.9 (*)     Mean Corpuscular Volume 99 (*)     Mean Corpuscular Hemoglobin 31.9 (*)     Platelets 77 (*)     Gran% 73.3 (*)     All other components within normal limits   URINALYSIS, REFLEX TO URINE CULTURE - Abnormal; Notable for the following components:    Ketones, UA Trace (*)     All other components within normal limits    Narrative:     Preferred Collection Type->Urine, Clean Catch   TSH   TSH        All Lab Results:  Results for orders placed or performed during the hospital encounter of 10/19/19   Comprehensive metabolic panel   Result Value Ref Range    Sodium 143 136 - 145 mmol/L    Potassium 3.9 3.5 - 5.1 mmol/L    Chloride 112 (H) 95 - 110 mmol/L    CO2 25 23 - 29 mmol/L    Glucose 127 (H) 70 - 110 mg/dL    BUN, Bld 27 (H) 6 - 20 mg/dL    Creatinine 0.6 0.5 - 1.4 mg/dL    Calcium 9.2 8.7 - 10.5 mg/dL    Total Protein 5.4 (L) 6.0 - 8.4 g/dL    Albumin 2.8 (L) 3.5 - 5.2 g/dL    Total Bilirubin 0.3 0.1 - 1.0 mg/dL    Alkaline Phosphatase 78 55 - 135 U/L    AST 32 10 - 40 U/L    ALT 41 10 - 44 U/L    Anion Gap 6 (L) 8 - 16 mmol/L    eGFR if African American >60 >60  mL/min/1.73 m^2    eGFR if non African American >60 >60 mL/min/1.73 m^2   CBC auto differential   Result Value Ref Range    WBC 5.06 3.90 - 12.70 K/uL    RBC 3.13 (L) 4.00 - 5.40 M/uL    Hemoglobin 10.0 (L) 12.0 - 16.0 g/dL    Hematocrit 30.9 (L) 37.0 - 48.5 %    Mean Corpuscular Volume 99 (H) 82 - 98 fL    Mean Corpuscular Hemoglobin 31.9 (H) 27.0 - 31.0 pg    Mean Corpuscular Hemoglobin Conc 32.4 32.0 - 36.0 g/dL    RDW 13.5 11.5 - 14.5 %    Platelets 77 (L) 150 - 350 K/uL    MPV 11.1 9.2 - 12.9 fL    Immature Granulocytes 0.2 0.0 - 0.5 %    Gran # (ANC) 3.7 1.8 - 7.7 K/uL    Immature Grans (Abs) 0.01 0.00 - 0.04 K/uL    Lymph # 1.0 1.0 - 4.8 K/uL    Mono # 0.4 0.3 - 1.0 K/uL    Eos # 0.0 0.0 - 0.5 K/uL    Baso # 0.01 0.00 - 0.20 K/uL    nRBC 0 0 /100 WBC    Gran% 73.3 (H) 38.0 - 73.0 %    Lymph% 18.8 18.0 - 48.0 %    Mono% 7.1 4.0 - 15.0 %    Eosinophil% 0.4 0.0 - 8.0 %    Basophil% 0.2 0.0 - 1.9 %    Differential Method Automated    Urinalysis, Reflex to Urine Culture Urine, Clean Catch   Result Value Ref Range    Specimen UA Urine, Catheterized     Color, UA Yellow Yellow, Straw, Minal    Appearance, UA Clear Clear    pH, UA 6.0 5.0 - 8.0    Specific Gravity, UA 1.025 1.005 - 1.030    Protein, UA Negative Negative    Glucose, UA Negative Negative    Ketones, UA Trace (A) Negative    Bilirubin (UA) Negative Negative    Occult Blood UA Negative Negative    Nitrite, UA Negative Negative    Urobilinogen, UA Negative <2.0 EU/dL    Leukocytes, UA Negative Negative         Imaging Results:  Imaging Results          CT Head Without Contrast (Final result)  Result time 10/19/19 15:25:24    Final result by Braden Zimmerman Jr., MD (10/19/19 15:25:24)                 Impression:      1. No acute intracranial findings.  All CT scans at this facility are performed  using dose modulation techniques as appropriate to performed exam including the following:  automated exposure control; adjustment of mA and/or kV according to  the patients size (this includes techniques or standardized protocols for targeted exams where dose is matched to indication/reason for exam: i.e. extremities or head);  iterative reconstruction technique.      Electronically signed by: Braden Zimmerman Jr., MD  Date:    10/19/2019  Time:    15:25             Narrative:    EXAMINATION:  CT HEAD WITHOUT CONTRAST    CLINICAL HISTORY:  Confusion/delirium, altered LOC, unexplained;    TECHNIQUE:  CT scan was obtained of the head without administration of contrast.    COMPARISON:  02/21/2018    FINDINGS:  Ventricles and basal cisterns are normal.  No hemorrhage, mass effect or midline shift.  No cerebral or cerebellar parenchymal abnormality.  Paranasal sinuses are clear.  Mastoid air cells are clear.  Calvarium is intact.                               X-Ray Pelvis Routine AP (Final result)  Result time 10/19/19 12:01:38    Final result by Braden Zimmerman Jr., MD (10/19/19 12:01:38)                 Impression:      1.  No acute fracture or dislocation pelvis.      Electronically signed by: Braden Zimmerman Jr., MD  Date:    10/19/2019  Time:    12:01             Narrative:    EXAMINATION:  XR PELVIS ROUTINE AP    COMPARISON:  None    FINDINGS:  The bones, joint spaces and soft tissues are within normal limits.  No acute fracture or dislocation.                               X-Ray Humerus 2 View Bilateral (Final result)  Result time 10/19/19 12:00:27   Procedure changed from X-Ray Humerus 2 View Left     Final result by Braden Zimmerman Jr., MD (10/19/19 12:00:27)                 Impression:      1.  Displaced angulated segmental fracture of the proximal left humerus and avulsion of the greater tuberosity.      Electronically signed by: Braden Zimmerman Jr., MD  Date:    10/19/2019  Time:    12:00             Narrative:    EXAMINATION:  XR HUMERUS 2 VIEW BILATERAL    CLINICAL HISTORY:  pain;Pain in left arm    COMPARISON:  None    FINDINGS:  Displaced angulated segmental proximal  3rd left humerus fracture involving the neck and proximal diaphysis.  Soft tissue swelling of the left arm.  Avulsion of the greater tuberosity.  Right humerus is intact.                                 The EKG was ordered, reviewed, and independently interpreted by the ED provider.  Interpretation time: 1102  Rate: 55 BPM  Rhythm: sinus bradycardia  Interpretation: No acute ST changes. No STEMI.           The Emergency Provider reviewed the vital signs and test results, which are outlined above.     ED Discussion     4:22 PM: Discussed with Dr. Lange who recommended sling and swathe and outpatient follow-up on Monday. Discussed case with Juan A Terrell MD ([facility]). Dr. Brasher agrees with current care and management of pt and accepts admission.   Admitting Service: [facility]  Admitting Physician: Juan A Terrell MD  Admit to: Observation/Tele  Dr. Lange updated about patient being placed in observation       Medical Decision Making:   Clinical Tests:   Lab Tests: Ordered and Reviewed  Radiological Study: Ordered and Reviewed  Medical Tests: Ordered and Reviewed  44-year-old female with autism presenting from a group home after a ground fall.  Patient has a humeral fracture.  Discussed with Orthopedics.  Patient is also drowsy, borderline hypotensive, hypothermic, and I suspect all this is what led to her fall.  No pain meds given due to her current mental status. She has not had improvement here in the emergency department.  She is on multiple psychiatric medications at the group home and I suspect overmedication from polypharmacy.  Patient is not safe for discharge at this point.  Patient will be admitted to Medicine.             ED Medication(s):  Medications   sodium chloride 0.9% bolus 1,000 mL (0 mLs Intravenous Stopped 10/19/19 8400)       New Prescriptions    No medications on file               Scribe Attestation:   Scribe #1: I performed the above scribed service and  the documentation accurately describes the services I performed. I attest to the accuracy of the note.     Attending:   Physician Attestation Statement for Scribe #1: I, Palomo Hernandez MD, personally performed the services described in this documentation, as scribed by Fidencio Mccoy, in my presence, and it is both accurate and complete.           Clinical Impression       ICD-10-CM ICD-9-CM   1. Altered mental status, unspecified altered mental status type R41.82 780.97   2. Fall W19.XXXA E888.9   3. Left arm pain M79.602 729.5   4. Hypothermia, initial encounter T68.XXXA 991.6   5. Bradycardia R00.1 427.89   6. Other closed displaced fracture of proximal end of left humerus, initial encounter S42.292A 812.09   7.      Lethargy  8.      Polypharmacy    Disposition:   Disposition: Placed in Observation  Condition: Fair         Palomo Hernandez MD  10/20/19 0807

## 2019-10-19 NOTE — ASSESSMENT & PLAN NOTE
Place in observation   CT of head showed no acute finding   Neuro checks   IVF  Patient on multiple psyc medications, may be over medicated, will hold for now   UDS   Telemetry monitor   History of MR and Autism,  evaluation

## 2019-10-19 NOTE — NURSING
Pt transferred to tele unit from ED in no acute distress. Current core bladder temp 94.1 F. Tele monitor #[id number] applied to pt and verified with monitor tech. Phone report received from ED nurse Jennifer RN prior to transporting pt to tele unit. PIV saline locked to RUE. Sling in place to LUE. Bed low, side rails up x2, bed alarm armed. Mother present at bedside. Pt is unable to communicate effectively due to hx  Does not answer orientation questions. Does not appear to have any physical indicators that she is in pain. Arouses easily, but does dose off frequently in between care. Bear dianelys and myesha bladder temperature monitor in place. Will continue to monitor.

## 2019-10-19 NOTE — ASSESSMENT & PLAN NOTE
Temp 93.5 in the Emergency Room   No current source of infection   Lactic acid   Chest X ray  Warming blanket  monitor

## 2019-10-20 PROBLEM — D62 ACUTE BLOOD LOSS ANEMIA: Status: ACTIVE | Noted: 2019-10-20

## 2019-10-20 LAB
ABO + RH BLD: NORMAL
ALBUMIN SERPL BCP-MCNC: 2.3 G/DL (ref 3.5–5.2)
ALP SERPL-CCNC: 69 U/L (ref 55–135)
ALT SERPL W/O P-5'-P-CCNC: 33 U/L (ref 10–44)
ANION GAP SERPL CALC-SCNC: 5 MMOL/L (ref 8–16)
AST SERPL-CCNC: 29 U/L (ref 10–40)
BASOPHILS # BLD AUTO: 0.01 K/UL (ref 0–0.2)
BASOPHILS NFR BLD: 0.3 % (ref 0–1.9)
BILIRUB SERPL-MCNC: 0.3 MG/DL (ref 0.1–1)
BLD GP AB SCN CELLS X3 SERPL QL: NORMAL
BLD PROD TYP BPU: NORMAL
BLOOD GROUP ANTIBODIES SERPL: NORMAL
BLOOD UNIT EXPIRATION DATE: NORMAL
BLOOD UNIT TYPE CODE: 6200
BLOOD UNIT TYPE: NORMAL
BUN SERPL-MCNC: 25 MG/DL (ref 6–20)
CALCIUM SERPL-MCNC: 8.5 MG/DL (ref 8.7–10.5)
CHLORIDE SERPL-SCNC: 114 MMOL/L (ref 95–110)
CO2 SERPL-SCNC: 21 MMOL/L (ref 23–29)
CODING SYSTEM: NORMAL
CREAT SERPL-MCNC: 0.6 MG/DL (ref 0.5–1.4)
DIFFERENTIAL METHOD: ABNORMAL
DISPENSE STATUS: NORMAL
EOSINOPHIL # BLD AUTO: 0 K/UL (ref 0–0.5)
EOSINOPHIL NFR BLD: 0.5 % (ref 0–8)
ERYTHROCYTE [DISTWIDTH] IN BLOOD BY AUTOMATED COUNT: 14.1 % (ref 11.5–14.5)
EST. GFR  (AFRICAN AMERICAN): >60 ML/MIN/1.73 M^2
EST. GFR  (NON AFRICAN AMERICAN): >60 ML/MIN/1.73 M^2
GLUCOSE SERPL-MCNC: 86 MG/DL (ref 70–110)
HCT VFR BLD AUTO: 24.6 % (ref 37–48.5)
HCT VFR BLD AUTO: 28.6 % (ref 37–48.5)
HGB BLD-MCNC: 7.5 G/DL (ref 12–16)
HGB BLD-MCNC: 9.3 G/DL (ref 12–16)
IMM GRANULOCYTES # BLD AUTO: 0.01 K/UL (ref 0–0.04)
IMM GRANULOCYTES NFR BLD AUTO: 0.3 % (ref 0–0.5)
INR PPP: 1 (ref 0.8–1.2)
LYMPHOCYTES # BLD AUTO: 1.3 K/UL (ref 1–4.8)
LYMPHOCYTES NFR BLD: 31.9 % (ref 18–48)
MCH RBC QN AUTO: 31 PG (ref 27–31)
MCHC RBC AUTO-ENTMCNC: 30.5 G/DL (ref 32–36)
MCV RBC AUTO: 102 FL (ref 82–98)
MONOCYTES # BLD AUTO: 0.5 K/UL (ref 0.3–1)
MONOCYTES NFR BLD: 11.7 % (ref 4–15)
NEUTROPHILS # BLD AUTO: 2.2 K/UL (ref 1.8–7.7)
NEUTROPHILS NFR BLD: 55.3 % (ref 38–73)
NRBC BLD-RTO: 0 /100 WBC
NUM UNITS TRANS PACKED RBC: NORMAL
PLATELET # BLD AUTO: 88 K/UL (ref 150–350)
PMV BLD AUTO: 11.6 FL (ref 9.2–12.9)
POTASSIUM SERPL-SCNC: 4.4 MMOL/L (ref 3.5–5.1)
PROT SERPL-MCNC: 4.4 G/DL (ref 6–8.4)
PROTHROMBIN TIME: 10.6 SEC (ref 9–12.5)
RBC # BLD AUTO: 2.42 M/UL (ref 4–5.4)
SODIUM SERPL-SCNC: 140 MMOL/L (ref 136–145)
WBC # BLD AUTO: 3.92 K/UL (ref 3.9–12.7)

## 2019-10-20 PROCEDURE — 86902 BLOOD TYPE ANTIGEN DONOR EA: CPT

## 2019-10-20 PROCEDURE — G0378 HOSPITAL OBSERVATION PER HR: HCPCS

## 2019-10-20 PROCEDURE — 80053 COMPREHEN METABOLIC PANEL: CPT

## 2019-10-20 PROCEDURE — 85018 HEMOGLOBIN: CPT

## 2019-10-20 PROCEDURE — 86905 BLOOD TYPING RBC ANTIGENS: CPT

## 2019-10-20 PROCEDURE — 86077 PATHOLOGIST INTERPRETATION AB/XM: ICD-10-PCS | Mod: ,,, | Performed by: PATHOLOGY

## 2019-10-20 PROCEDURE — 86077 PHYS BLOOD BANK SERV XMATCH: CPT | Mod: ,,, | Performed by: PATHOLOGY

## 2019-10-20 PROCEDURE — 25500020 PHARM REV CODE 255: Performed by: INTERNAL MEDICINE

## 2019-10-20 PROCEDURE — P9016 RBC LEUKOCYTES REDUCED: HCPCS

## 2019-10-20 PROCEDURE — 85025 COMPLETE CBC W/AUTO DIFF WBC: CPT

## 2019-10-20 PROCEDURE — 36415 COLL VENOUS BLD VENIPUNCTURE: CPT

## 2019-10-20 PROCEDURE — 63600175 PHARM REV CODE 636 W HCPCS: Performed by: NURSE PRACTITIONER

## 2019-10-20 PROCEDURE — 86850 RBC ANTIBODY SCREEN: CPT

## 2019-10-20 PROCEDURE — 86870 RBC ANTIBODY IDENTIFICATION: CPT

## 2019-10-20 PROCEDURE — 25000003 PHARM REV CODE 250: Performed by: NURSE PRACTITIONER

## 2019-10-20 PROCEDURE — 86922 COMPATIBILITY TEST ANTIGLOB: CPT

## 2019-10-20 PROCEDURE — 36430 TRANSFUSION BLD/BLD COMPNT: CPT

## 2019-10-20 PROCEDURE — 96361 HYDRATE IV INFUSION ADD-ON: CPT

## 2019-10-20 PROCEDURE — 85014 HEMATOCRIT: CPT

## 2019-10-20 RX ORDER — HYDROCODONE BITARTRATE AND ACETAMINOPHEN 500; 5 MG/1; MG/1
TABLET ORAL
Status: DISCONTINUED | OUTPATIENT
Start: 2019-10-20 | End: 2019-10-25 | Stop reason: HOSPADM

## 2019-10-20 RX ORDER — SODIUM CHLORIDE 0.9 % (FLUSH) 0.9 %
10 SYRINGE (ML) INJECTION
Status: DISCONTINUED | OUTPATIENT
Start: 2019-10-20 | End: 2019-10-25 | Stop reason: HOSPADM

## 2019-10-20 RX ORDER — HYDROCODONE BITARTRATE AND ACETAMINOPHEN 500; 5 MG/1; MG/1
TABLET ORAL
Status: DISCONTINUED | OUTPATIENT
Start: 2019-10-20 | End: 2019-10-23 | Stop reason: SDUPTHER

## 2019-10-20 RX ADMIN — LEVOTHYROXINE SODIUM 75 MCG: 75 TABLET ORAL at 05:10

## 2019-10-20 RX ADMIN — IOHEXOL 100 ML: 350 INJECTION, SOLUTION INTRAVENOUS at 03:10

## 2019-10-20 NOTE — ASSESSMENT & PLAN NOTE
Anemia due to blood lost at Lt Humerus fracture site   Transfuse 2 unit of PRBC   CTA of Lt arm, evaluated for intimal tear   Orthopedic surgery following  Serial H & H, Transfusion
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
## 2019-10-20 NOTE — SIGNIFICANT EVENT
Was notified of hypotensive event. Patient is chronically hypotensive, but a little lower than baseline. She is also here for displaced segmental fracture of the proximal left humerus. Orthopedic surgery has already been consulted by daytime provider. Repeat hemoglobin shows a 2 gram drop in hemoglobin. Will type and cross and Transfuse.
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
## 2019-10-20 NOTE — NURSING
PCT routinely checking vitals. T 97.5, P 73, R 18, O2 98%, BP 86/46. Pt asymptomatic, Dreshellia NP and Dr. Oswald notified. New orders noted, 500mL bolus to be infused. Pt being monitored closely.
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
## 2019-10-20 NOTE — ASSESSMENT & PLAN NOTE
Temp 93.5 in the Emergency Room   No current source of infection   Lactic acid   Chest X ray  Warming blanket  monitor      10/20  Normal temp   Vital signs stable

## 2019-10-20 NOTE — PLAN OF CARE
"Patient tolerated 2 units of RBCs transfused this morning. No s/s of adverse reactions noted. Biswas cath removed. Patient able to make some of her needs known. She ask random questions but able to recall her name and family members. She keep saying "Sad arm broke." When asked if she in pain she denies. Admit questions asked by primary nurse and mom gave information. Swelling noted to upper and lower extremities, NP notified. No other concerns voiced at this time.   "
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
## 2019-10-21 PROBLEM — F84.0 AUTISM SPECTRUM DISORDER: Status: ACTIVE | Noted: 2019-10-21

## 2019-10-21 PROBLEM — F20.9 SCHIZOPHRENIA: Status: ACTIVE | Noted: 2019-10-21

## 2019-10-21 PROBLEM — R00.1 BRADYCARDIA: Status: RESOLVED | Noted: 2019-10-19 | Resolved: 2019-10-21

## 2019-10-21 PROBLEM — T68.XXXA HYPOTHERMIA: Status: RESOLVED | Noted: 2019-10-19 | Resolved: 2019-10-21

## 2019-10-21 PROBLEM — F32.A DEPRESSION: Status: ACTIVE | Noted: 2019-10-21

## 2019-10-21 PROBLEM — E27.40 ADRENAL INSUFFICIENCY: Status: ACTIVE | Noted: 2019-10-21

## 2019-10-21 PROBLEM — Z86.59 PERSONAL HISTORY OF MENTAL DISORDER: Status: ACTIVE | Noted: 2019-10-21

## 2019-10-21 LAB
ALBUMIN SERPL BCP-MCNC: 2.6 G/DL (ref 3.5–5.2)
ALP SERPL-CCNC: 76 U/L (ref 55–135)
ALT SERPL W/O P-5'-P-CCNC: 32 U/L (ref 10–44)
ANION GAP SERPL CALC-SCNC: 7 MMOL/L (ref 8–16)
AST SERPL-CCNC: 29 U/L (ref 10–40)
BASOPHILS # BLD AUTO: 0.01 K/UL (ref 0–0.2)
BASOPHILS NFR BLD: 0.2 % (ref 0–1.9)
BILIRUB SERPL-MCNC: 0.5 MG/DL (ref 0.1–1)
BUN SERPL-MCNC: 17 MG/DL (ref 6–20)
CALCIUM SERPL-MCNC: 9 MG/DL (ref 8.7–10.5)
CHLORIDE SERPL-SCNC: 112 MMOL/L (ref 95–110)
CO2 SERPL-SCNC: 20 MMOL/L (ref 23–29)
CREAT SERPL-MCNC: 0.7 MG/DL (ref 0.5–1.4)
DIFFERENTIAL METHOD: ABNORMAL
EOSINOPHIL # BLD AUTO: 0 K/UL (ref 0–0.5)
EOSINOPHIL NFR BLD: 0.3 % (ref 0–8)
ERYTHROCYTE [DISTWIDTH] IN BLOOD BY AUTOMATED COUNT: 16 % (ref 11.5–14.5)
EST. GFR  (AFRICAN AMERICAN): >60 ML/MIN/1.73 M^2
EST. GFR  (NON AFRICAN AMERICAN): >60 ML/MIN/1.73 M^2
GLUCOSE SERPL-MCNC: 82 MG/DL (ref 70–110)
HCT VFR BLD AUTO: 29.6 % (ref 37–48.5)
HGB BLD-MCNC: 9.7 G/DL (ref 12–16)
IMM GRANULOCYTES # BLD AUTO: 0.06 K/UL (ref 0–0.04)
IMM GRANULOCYTES NFR BLD AUTO: 1 % (ref 0–0.5)
LYMPHOCYTES # BLD AUTO: 1.8 K/UL (ref 1–4.8)
LYMPHOCYTES NFR BLD: 29.1 % (ref 18–48)
MCH RBC QN AUTO: 30.7 PG (ref 27–31)
MCHC RBC AUTO-ENTMCNC: 32.8 G/DL (ref 32–36)
MCV RBC AUTO: 94 FL (ref 82–98)
MONOCYTES # BLD AUTO: 0.7 K/UL (ref 0.3–1)
MONOCYTES NFR BLD: 11.3 % (ref 4–15)
NEUTROPHILS # BLD AUTO: 3.5 K/UL (ref 1.8–7.7)
NEUTROPHILS NFR BLD: 58.1 % (ref 38–73)
NRBC BLD-RTO: 0 /100 WBC
PATHOLOGIST INTERPRETATION AB/XM: NORMAL
PLATELET # BLD AUTO: 102 K/UL (ref 150–350)
PMV BLD AUTO: 11.4 FL (ref 9.2–12.9)
POTASSIUM SERPL-SCNC: 4.4 MMOL/L (ref 3.5–5.1)
PROT SERPL-MCNC: 5.3 G/DL (ref 6–8.4)
RBC # BLD AUTO: 3.16 M/UL (ref 4–5.4)
SODIUM SERPL-SCNC: 139 MMOL/L (ref 136–145)
WBC # BLD AUTO: 6.08 K/UL (ref 3.9–12.7)

## 2019-10-21 PROCEDURE — 36415 COLL VENOUS BLD VENIPUNCTURE: CPT

## 2019-10-21 PROCEDURE — 25000003 PHARM REV CODE 250: Performed by: NURSE PRACTITIONER

## 2019-10-21 PROCEDURE — 85025 COMPLETE CBC W/AUTO DIFF WBC: CPT

## 2019-10-21 PROCEDURE — 80053 COMPREHEN METABOLIC PANEL: CPT

## 2019-10-21 PROCEDURE — 21400001 HC TELEMETRY ROOM

## 2019-10-21 RX ORDER — ACETAMINOPHEN 325 MG/1
650 TABLET ORAL EVERY 6 HOURS PRN
Status: DISCONTINUED | OUTPATIENT
Start: 2019-10-21 | End: 2019-10-25 | Stop reason: HOSPADM

## 2019-10-21 RX ADMIN — LEVOTHYROXINE SODIUM 75 MCG: 75 TABLET ORAL at 05:10

## 2019-10-21 RX ADMIN — ACETAMINOPHEN 650 MG: 325 TABLET ORAL at 09:10

## 2019-10-21 NOTE — ASSESSMENT & PLAN NOTE
- Anemia likely secondary to blood loss at Lt Humerus fracture site   - Hgb improved to 9.3 s/p 2 units PRBCs  - Monitor with daily CBC and transfuse as needed

## 2019-10-21 NOTE — ASSESSMENT & PLAN NOTE
- Placed in observation   - CT of head showed no acute findings   - Minimal verbal response  - Currently awake and alert, difficult to obtain baseline given h/o MR and Autism  - Continue Neuro checks q 4 hours  - Patient on multiple psyc medications, with concern for patient being overmedicated  - Hold home meds for now   - UDS pending

## 2019-10-21 NOTE — PLAN OF CARE
Ongoing (interventions implemented as appropriate)  Pt is mentally challenged.  VSS  Pt able to make needs known.  Pt remained afebrile throughout this shift.   Pt ambulates in room with assistance, gait unsteady   Pt remained free of falls this shift.   Pt denies pain this shift.  Plan of care reviewed. Patient verbalizes understanding.   Pt moving/turing independent. Frequent weight shifting encouraged.  Patient sinus rhythm on monitor.   Bed low, side rails up x 2, wheels locked, call light in reach.   Hourly rounding completed.   Will continue to monitor.
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
## 2019-10-21 NOTE — ASSESSMENT & PLAN NOTE
- Per Care Everywhere patient was discharged in 2017 on Hydrocortisone 10 mg po BID  - Will attempt to reconcile home meds and resume at current home dose
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
## 2019-10-21 NOTE — ASSESSMENT & PLAN NOTE
- Multiple home meds currently on hold given concern for over-medication  - Will attempt to reconcile home meds  - May require psychiatry evaluation for adjustment of medications  - Supportive care

## 2019-10-21 NOTE — SIGNIFICANT EVENT
Case d/w Dr. Lange who does not recommend operative repair of LUE at this time, rather conservative treatment to include coaptation splint and weekly xrays.  CM following for DC planning.  If hgb remains stable overnight patient will be cleared for DC tomorrow.

## 2019-10-21 NOTE — ASSESSMENT & PLAN NOTE
- CTA of LUE yesterday showed highly comminuted displaced fracture of the proximal left humeral diaphysis with extension into the humeral head with avulsion of the greater tuberosity. Fat and complex glenohumeral joint effusion consistent with lipohemarthrosis.  Marked soft tissue swelling of the left shoulder and left upper extremity.  No evidence of vascular extravasation, occlusion or dissection.  Age indeterminate partially visualized compression fractures of the L3 and L5 vertebral bodies.   - Sling in use  - Orthopedic Surgery Consulted, awaiting additional recs  - PRN analgesia  - Monitor

## 2019-10-22 PROBLEM — D72.829 LEUKOCYTOSIS: Status: ACTIVE | Noted: 2019-10-22

## 2019-10-22 LAB
ALBUMIN SERPL BCP-MCNC: 2.6 G/DL (ref 3.5–5.2)
ALP SERPL-CCNC: 81 U/L (ref 55–135)
ALT SERPL W/O P-5'-P-CCNC: 29 U/L (ref 10–44)
ANION GAP SERPL CALC-SCNC: 9 MMOL/L (ref 8–16)
AST SERPL-CCNC: 26 U/L (ref 10–40)
BASOPHILS # BLD AUTO: 0.02 K/UL (ref 0–0.2)
BASOPHILS NFR BLD: 0.2 % (ref 0–1.9)
BILIRUB SERPL-MCNC: 0.6 MG/DL (ref 0.1–1)
BUN SERPL-MCNC: 20 MG/DL (ref 6–20)
CALCIUM SERPL-MCNC: 9.3 MG/DL (ref 8.7–10.5)
CHLORIDE SERPL-SCNC: 109 MMOL/L (ref 95–110)
CO2 SERPL-SCNC: 20 MMOL/L (ref 23–29)
CREAT SERPL-MCNC: 0.7 MG/DL (ref 0.5–1.4)
DIFFERENTIAL METHOD: ABNORMAL
EOSINOPHIL # BLD AUTO: 0 K/UL (ref 0–0.5)
EOSINOPHIL NFR BLD: 0.2 % (ref 0–8)
ERYTHROCYTE [DISTWIDTH] IN BLOOD BY AUTOMATED COUNT: 15.5 % (ref 11.5–14.5)
EST. GFR  (AFRICAN AMERICAN): >60 ML/MIN/1.73 M^2
EST. GFR  (NON AFRICAN AMERICAN): >60 ML/MIN/1.73 M^2
GLUCOSE SERPL-MCNC: 91 MG/DL (ref 70–110)
HCT VFR BLD AUTO: 32.6 % (ref 37–48.5)
HGB BLD-MCNC: 10.7 G/DL (ref 12–16)
IMM GRANULOCYTES # BLD AUTO: 0.13 K/UL (ref 0–0.04)
IMM GRANULOCYTES NFR BLD AUTO: 1 % (ref 0–0.5)
LYMPHOCYTES # BLD AUTO: 2.5 K/UL (ref 1–4.8)
LYMPHOCYTES NFR BLD: 19.4 % (ref 18–48)
MCH RBC QN AUTO: 30.4 PG (ref 27–31)
MCHC RBC AUTO-ENTMCNC: 32.8 G/DL (ref 32–36)
MCV RBC AUTO: 93 FL (ref 82–98)
MONOCYTES # BLD AUTO: 1.7 K/UL (ref 0.3–1)
MONOCYTES NFR BLD: 13 % (ref 4–15)
NEUTROPHILS # BLD AUTO: 8.6 K/UL (ref 1.8–7.7)
NEUTROPHILS NFR BLD: 66.2 % (ref 38–73)
NRBC BLD-RTO: 1 /100 WBC
PLATELET # BLD AUTO: 113 K/UL (ref 150–350)
PMV BLD AUTO: 10.8 FL (ref 9.2–12.9)
POTASSIUM SERPL-SCNC: 5.4 MMOL/L (ref 3.5–5.1)
PROT SERPL-MCNC: 5.6 G/DL (ref 6–8.4)
RBC # BLD AUTO: 3.52 M/UL (ref 4–5.4)
SODIUM SERPL-SCNC: 138 MMOL/L (ref 136–145)
WBC # BLD AUTO: 13.02 K/UL (ref 3.9–12.7)

## 2019-10-22 PROCEDURE — 80053 COMPREHEN METABOLIC PANEL: CPT

## 2019-10-22 PROCEDURE — 25000003 PHARM REV CODE 250: Performed by: NURSE PRACTITIONER

## 2019-10-22 PROCEDURE — 85025 COMPLETE CBC W/AUTO DIFF WBC: CPT

## 2019-10-22 PROCEDURE — 63600175 PHARM REV CODE 636 W HCPCS: Performed by: NURSE PRACTITIONER

## 2019-10-22 PROCEDURE — 97166 OT EVAL MOD COMPLEX 45 MIN: CPT

## 2019-10-22 PROCEDURE — 97161 PT EVAL LOW COMPLEX 20 MIN: CPT | Performed by: PHYSICAL THERAPIST

## 2019-10-22 PROCEDURE — 36415 COLL VENOUS BLD VENIPUNCTURE: CPT

## 2019-10-22 PROCEDURE — 97116 GAIT TRAINING THERAPY: CPT | Performed by: PHYSICAL THERAPIST

## 2019-10-22 PROCEDURE — 21400001 HC TELEMETRY ROOM

## 2019-10-22 RX ORDER — RISPERIDONE 1 MG/1
1 TABLET ORAL NIGHTLY
Status: DISCONTINUED | OUTPATIENT
Start: 2019-10-22 | End: 2019-10-25 | Stop reason: HOSPADM

## 2019-10-22 RX ORDER — TOPIRAMATE 25 MG/1
50 TABLET ORAL 2 TIMES DAILY
Status: DISCONTINUED | OUTPATIENT
Start: 2019-10-22 | End: 2019-10-25 | Stop reason: HOSPADM

## 2019-10-22 RX ORDER — BENZTROPINE MESYLATE 0.5 MG/1
0.5 TABLET ORAL 2 TIMES DAILY
Status: DISCONTINUED | OUTPATIENT
Start: 2019-10-22 | End: 2019-10-25 | Stop reason: HOSPADM

## 2019-10-22 RX ORDER — CITALOPRAM 20 MG/1
20 TABLET, FILM COATED ORAL DAILY
Status: DISCONTINUED | OUTPATIENT
Start: 2019-10-22 | End: 2019-10-25 | Stop reason: HOSPADM

## 2019-10-22 RX ORDER — RISPERIDONE 1 MG/1
1 TABLET ORAL NIGHTLY
COMMUNITY

## 2019-10-22 RX ORDER — CLONAZEPAM 1 MG/1
2 TABLET ORAL NIGHTLY
Status: ON HOLD | COMMUNITY
End: 2019-10-23 | Stop reason: SDUPTHER

## 2019-10-22 RX ORDER — RISPERIDONE 0.5 MG/1
0.5 TABLET ORAL 2 TIMES DAILY
Status: DISCONTINUED | OUTPATIENT
Start: 2019-10-22 | End: 2019-10-25 | Stop reason: HOSPADM

## 2019-10-22 RX ORDER — QUETIAPINE FUMARATE 25 MG/1
50 TABLET, FILM COATED ORAL DAILY
Status: DISCONTINUED | OUTPATIENT
Start: 2019-10-22 | End: 2019-10-25 | Stop reason: HOSPADM

## 2019-10-22 RX ORDER — DIVALPROEX SODIUM 500 MG/1
500 TABLET, DELAYED RELEASE ORAL EVERY 12 HOURS
Status: DISCONTINUED | OUTPATIENT
Start: 2019-10-22 | End: 2019-10-25 | Stop reason: HOSPADM

## 2019-10-22 RX ORDER — CLONAZEPAM 1 MG/1
2 TABLET ORAL NIGHTLY PRN
Status: DISCONTINUED | OUTPATIENT
Start: 2019-10-22 | End: 2019-10-25 | Stop reason: HOSPADM

## 2019-10-22 RX ORDER — LEVOFLOXACIN 750 MG/1
750 TABLET ORAL DAILY
Status: DISCONTINUED | OUTPATIENT
Start: 2019-10-22 | End: 2019-10-25 | Stop reason: HOSPADM

## 2019-10-22 RX ORDER — CLONAZEPAM 1 MG/1
2 TABLET ORAL NIGHTLY
Status: DISCONTINUED | OUTPATIENT
Start: 2019-10-22 | End: 2019-10-22

## 2019-10-22 RX ADMIN — QUETIAPINE 50 MG: 25 TABLET ORAL at 02:10

## 2019-10-22 RX ADMIN — LEVOTHYROXINE SODIUM 75 MCG: 75 TABLET ORAL at 05:10

## 2019-10-22 RX ADMIN — RISPERIDONE 1 MG: 1 TABLET ORAL at 09:10

## 2019-10-22 RX ADMIN — CITALOPRAM HYDROBROMIDE 20 MG: 20 TABLET ORAL at 02:10

## 2019-10-22 RX ADMIN — TOPIRAMATE 50 MG: 25 TABLET, FILM COATED ORAL at 09:10

## 2019-10-22 RX ADMIN — LEVOFLOXACIN 750 MG: 750 TABLET, FILM COATED ORAL at 06:10

## 2019-10-22 RX ADMIN — ACETAMINOPHEN 650 MG: 325 TABLET ORAL at 08:10

## 2019-10-22 RX ADMIN — BENZTROPINE MESYLATE 0.5 MG: 0.5 TABLET ORAL at 09:10

## 2019-10-22 RX ADMIN — DIVALPROEX SODIUM 500 MG: 500 TABLET, DELAYED RELEASE ORAL at 09:10

## 2019-10-22 RX ADMIN — RISPERIDONE 0.5 MG: 0.5 TABLET ORAL at 09:10
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
## 2019-10-22 NOTE — PT/OT/SLP EVAL
Occupational Therapy   Evaluation    Name: Suzie Fofana  MRN: [id number]  Admitting Diagnosis:  Altered mental status      Recommendations:     Discharge Recommendations: home with home health, outpatient PT  Discharge Equipment Recommendations:  (TBD)  Barriers to discharge:  None    Assessment:     Suzie Fofana is a 44 y.o. female with a medical diagnosis of Altered mental status.  She presents with DEBILITY, IMPAIRED ADLS, FUNCTIONAL MOBILITY, SAFETY AWARENESS. Performance deficits affecting function: weakness, impaired endurance, gait instability, impaired functional mobilty, impaired self care skills, impaired balance, decreased upper extremity function, visual deficits, impaired cognition, decreased coordination, decreased safety awareness, pain, orthopedic precautions.      Rehab Prognosis: Good; patient would benefit from acute skilled OT services to address these deficits and reach maximum level of function.       Plan:     Patient to be seen 3 x/week to address the above listed problems via self-care/home management, therapeutic exercises, therapeutic activities  · Plan of Care Expires: 10/29/19  · Plan of Care Reviewed with: patient, mother    Subjective     Chief Complaint: TIRED  Patient/Family Comments/goals: RETURN TO PLOF    Occupational Profile:  Living Environment: LIVES IN GROUP HOME, ONE STORY HOME NO STEPS TO ENTER; SPENDS DAY AT DAY CENTER, TRANSPORTATION PROVIDED  Previous level of function: INDEPENDENT, OCCASIONAL ASSISTANCE FROM GROUP HOME FOR DRESSING (MINIMAL)  Roles and Routines: MINIMAL   Equipment Used at Home:  none  Assistance upon Discharge: GROUP HOME    Pain/Comfort:  · Pain Rating 1: 0/10  · Pain Rating Post-Intervention 1: 0/10    Patients cultural, spiritual, Cheondoism conflicts given the current situation:      Objective:     Communicated with: NURSE prior to session.  Patient found supine with bed alarm, telemetry, peripheral IV upon OT entry to room.    General  Precautions: Standard, fall   Orthopedic Precautions:LUE non weight bearing   Braces: UE Sling     Occupational Performance:    Bed Mobility:    · Patient completed Rolling/Turning to Right with moderate assistance  · Patient completed Scooting/Bridging with moderate assistance  · Patient completed Supine to Sit with moderate assistance  · Patient completed Sit to Supine with moderate assistance    Functional Mobility/Transfers:  · Patient completed Sit <> Stand Transfer with minimum assistance  with  hand-held assist   · Functional Mobility: AMBULATED MIN A X2 WITH HANDHELD ASSISTANCE X100 FEET    Cognitive/Visual Perceptual:  Cognitive/Psychosocial Skills:     -       Oriented to: Person and Place   -       Follows Commands/attention:Follows one-step commands  -       Memory: No Deficits noted  -       Safety awareness/insight to disability: impaired     Physical Exam:  Balance:    -       FAIR]  Upper Extremity Range of Motion:     -       Right Upper Extremity: WFL  -       Left Upper Extremity: UNABLE TO TEST  DUE TO SLING  Upper Extremity Strength:    -       Right Upper Extremity: WFL  -       Left Upper Extremity: UNABLE TO TEST    AMPAC 6 Click ADL:  AMPAC Total Score: 13    Treatment & Education:  PT  PARTICIPATED IN INITIAL OT EVALUATION TO ASSESS CURRENT LEVEL OF FUNCTION. PT WILL BENEFIT FROM SKILLED OT SERVICES TO INCREASE FUNCTIONAL INDEPENDENCE AND SAFETY.  Education:    Patient left supine with all lines intact, call button in reach, bed alarm on, NURSING notified and MOTHER present    GOALS:   Multidisciplinary Problems     Occupational Therapy Goals        Problem: Occupational Therapy Goal    Goal Priority Disciplines Outcome Interventions   Occupational Therapy Goal     OT, PT/OT     Description:  Goals to be met by: 10/29/19     Patient will increase functional independence with ADLs by performing:    UE Dressing with Moderate Assistance.  LE Dressing with Moderate Assistance.  Grooming while  standing at sink with Contact Guard Assistance.  Toileting from toilet with Moderate Assistance for hygiene and clothing management.   Toilet transfer to toilet with Moderate Assistance.  Upper extremity exercise program x10 reps per handout, with assistance as needed.                      History:     Past Medical History:   Diagnosis Date    Autistic disorder     Bradycardia 10/19/2019    Hypothermia 10/19/2019    IDDM (insulin dependent diabetes mellitus)     Mental retardation     Seizures     Thyroid disease     Tourette disease        Past Surgical History:   Procedure Laterality Date    EYE SURGERY      MOUTH SURGERY         Time Tracking:     OT Date of Treatment: 10/22/19  OT Start Time: 1035  OT Stop Time: 1050  OT Total Time (min): 15 min    Billable Minutes:Evaluation 15    Betsy Gonzalez OT  10/22/2019

## 2019-10-22 NOTE — PLAN OF CARE
Pt alert, disoriented to time and situation.   SR on monitor.   PIV saline locked. Dressing is clean, dry and intact.  Pt has intermittent pain to left shoulder. PRN pain medication administered as ordered.  Patient sitting in chair for dinner, per Yulia NP.  Frequent weight shifting encouraged.  Bed low, side rails up x2, wheels locked, call light in reach.  Plan of care reviewed. Patient's mother verbalized understanding.

## 2019-10-22 NOTE — ASSESSMENT & PLAN NOTE
- Placed in observation   - CT of head showed no acute findings   - Improving and patient is awake and alert  - Minimal to no verbal response at baseline  - Currently awake and alert, difficult to obtain baseline given h/o MR and Autism  - Continue Neuro checks q 4 hours  - Patient on multiple psyc medications, with concern for patient being overmedicated  - Home medications reconciled and resumed as appropriate  - Monitor

## 2019-10-22 NOTE — PT/OT/SLP EVAL
Physical Therapy Evaluation    Patient Name:  Suzie Fofana   MRN:  [id number]    Recommendations:     Discharge Recommendations:  home with home health, outpatient PT   Discharge Equipment Recommendations: (TBD)   Barriers to discharge: Decreased caregiver support    Assessment:     Suzie Fofana is a 44 y.o. female admitted with a medical diagnosis of Altered mental status.  She presents with the following impairments/functional limitations:  weakness, impaired endurance, gait instability, impaired functional mobilty, impaired self care skills, impaired balance, impaired cognition, decreased lower extremity function, decreased upper extremity function, decreased coordination, decreased safety awareness, pain, edema, orthopedic precautions.    Rehab Prognosis: Good; patient would benefit from acute skilled PT services to address these deficits and reach maximum level of function.    Recent Surgery: * No surgery found *      Plan:     During this hospitalization, patient to be seen 5 x/week to address the identified rehab impairments via gait training, therapeutic activities, therapeutic exercises and progress toward the following goals:    · Plan of Care Expires:  10/29/19    Subjective     Chief Complaint: no complaints  Patient/Family Comments/goals: to get stronger  Pain/Comfort:  · Pain Rating 1: 0/10  · Pain Rating Post-Intervention 1: 0/10    Patients cultural, spiritual, Jain conflicts given the current situation: no    Living Environment:  Lives in a group home and goes to a Day program during the day. Pt has visual impairment, did not speak during eval, mother was present throughout.  Prior to admission, patients level of function was Assist with ADLs and Mod I with Ambulation.  Equipment used at home: none.  DME owned (not currently used): none.  Upon discharge, patient will have assistance from FAMILY?.    Objective:     Communicated with Nurse Johnson and [facility] chart review prior to session.   Patient found supine with telemetry, peripheral IV, bed alarm  upon PT entry to room.    General Precautions: Standard, fall   Orthopedic Precautions:LUE non weight bearing   Braces: UE Sling     Exams:  · Cognitive Exam:  Patient is oriented to Person  · Postural Exam:  Patient presented with the following abnormalities:    · -       Rounded shoulders  · -       Forward head  · -       (L) UE held in sling  · RLE ROM: WFL  · RLE Strength: 4/5 grossly  · LLE ROM: WFL  · LLE Strength: 4/5 grossly    Functional Mobility:  · Bed Mobility:     · Rolling Left:  moderate assistance  · Rolling Right: moderate assistance  · Scooting: moderate assistance  · Supine to Sit: moderate assistance  · Sit to Supine: moderate assistance  · Transfers:     · Sit to Stand:  minimum assistance with hand-held assist  · Bed to Chair: minimum assistance with  hand-held assist  using  Step Transfer  · Gait: ~100ft using HHA with Min a  · Balance: Poor+      Therapeutic Activities and Exercises:   PT eval completed as listed above. Pt and mother educated in role of PT and POC.     AM-PAC 6 CLICK MOBILITY  Total Score:16     Patient left supine with all lines intact, call button in reach, bed alarm on, Nurse Elizabeth notified and mother present.    GOALS:   Multidisciplinary Problems     Physical Therapy Goals        Problem: Physical Therapy Goal    Goal Priority Disciplines Outcome Goal Variances Interventions   Physical Therapy Goal     PT, PT/OT      Description:  LTGs to be met by 10/29/19  1. Pt will perform bed mobility with Supervision  2. Pt will perform sit<>stand functional t/fs with Supervision  3. Pt will ambulate ~200ft without AD with Supervision  4. Pt will perform (B) LE therapeutic exercises 1 x 20 reps                    History:     Past Medical History:   Diagnosis Date    Autistic disorder     Bradycardia 10/19/2019    Hypothermia 10/19/2019    IDDM (insulin dependent diabetes mellitus)     Mental retardation      Seizures     Thyroid disease     Tourette disease        Past Surgical History:   Procedure Laterality Date    EYE SURGERY      MOUTH SURGERY         Time Tracking:     PT Received On: 10/22/19  PT Start Time: 1035     PT Stop Time: 1100  PT Total Time (min): 25 min     Billable Minutes: Evaluation 15 min and Gait Training 10 min      Jolie Phillips, PT/OT  10/22/2019

## 2019-10-22 NOTE — ASSESSMENT & PLAN NOTE
- Per Care Everywhere patient was discharged in 2017 on Hydrocortisone 10 mg po BID  - Per current home med list patient is no longer taking supplementation  - Will need outpatient f/u with PCP Dr. Jim Cerda upon DC

## 2019-10-22 NOTE — PLAN OF CARE
Problem: Adult Inpatient Plan of Care  Goal: Plan of Care Review  Outcome: Ongoing, Progressing   Pt AAO to self and place, disoriented to time and situation. Pt able to make needs known, mother at bedside. VSS. Pt remained free of falls this shift. No complaints of pain or discomfort. Medications administered as ordered. Pt is NSR on monitor. PIV intact, saline locked. L arm stabilized in sling, significant bruising noted. Hourly rounding completed. Pt instructed to call for assistance. POC reviewed. Pt verbalized understanding. Will continue to monitor.

## 2019-10-22 NOTE — SUBJECTIVE & OBJECTIVE
Interval History:  No acute events overnight.  Currently resting comfortably in bed in NAD with mother at BS.  T max 100.2.  WBCs mildly elevated at 13k.  Will repeat UA and CXR today.  Likely reactive in nature and r/t increased immobility, but will r/o acute process.  PT/OT consulted and patient ambulated down the robb.  Will attempt use of IS if patient is able to perform.  Encouraged to get OOB TID.  LUE hematoma/ecchymosis remains stable with no enlargement.  Home medications reconciled per med list from [place] and resumed as appropriate.  CM continues to follow for DC planning.       Review of Systems   Unable to perform ROS: Other (Non-verbal with a history of MR and Autism. )     Objective:     Vital Signs (Most Recent):  Temp: 100.2 °F (37.9 °C) (10/22/19 1159)  Pulse: 96 (10/22/19 1159)  Resp: 18 (10/22/19 1159)  BP: (!) 124/58 (10/22/19 1159)  SpO2: 96 % (10/22/19 1159) Vital Signs (24h Range):  Temp:  [98.1 °F (36.7 °C)-100.2 °F (37.9 °C)] 100.2 °F (37.9 °C)  Pulse:  [] 96  Resp:  [14-20] 18  SpO2:  [95 %-97 %] 96 %  BP: (119-124)/(56-61) 124/58     Weight: 93.8 kg (206 lb 12.7 oz)  Body mass index is 37.82 kg/m².    Intake/Output Summary (Last 24 hours) at 10/22/2019 1435  Last data filed at 10/22/2019 0500  Gross per 24 hour   Intake 1269 ml   Output 300 ml   Net 969 ml      Physical Exam   Constitutional: She appears well-developed and well-nourished. No distress.   HENT:   Head: Normocephalic and atraumatic.   Mouth/Throat: Oropharynx is clear and moist. No oropharyngeal exudate.   Eyes: Pupils are equal, round, and reactive to light. Conjunctivae are normal. Right eye exhibits no discharge. Left eye exhibits no discharge.   Neck: Normal range of motion. Neck supple. No JVD present.   Cardiovascular: Normal rate, regular rhythm, normal heart sounds and intact distal pulses. Exam reveals no gallop and no friction rub.   No murmur heard.  Pulses:       Radial pulses are 2+ on the left side.    Pulmonary/Chest: Effort normal and breath sounds normal. No stridor. No respiratory distress. She has no wheezes. She has no rales. She exhibits no tenderness.   Abdominal: Soft. Bowel sounds are normal. She exhibits no distension and no mass. There is no tenderness. There is no rebound and no guarding. No hernia.   Musculoskeletal: Normal range of motion. She exhibits edema (left upper arm ). She exhibits no tenderness or deformity.   Sling to Left arm  +2 Left radial pulse   Left arm warm    Neurological: She is alert.   Skin: Skin is warm and dry. Capillary refill takes less than 2 seconds. No rash noted. She is not diaphoretic. No erythema.   Generalized edema to LUE.  + ecchymosis to left anterior and posterior shoulder.  + hematoma to left anterior arm.   Psychiatric: She has a normal mood and affect. Her behavior is normal.   Nursing note and vitals reviewed.      Significant Labs:   CBC:   Recent Labs   Lab 10/20/19  1519 10/21/19  0439 10/22/19  0530   WBC  --  6.08 13.02*   HGB 9.3* 9.7* 10.7*   HCT 28.6* 29.6* 32.6*   PLT  --  102* 113*     CMP:   Recent Labs   Lab 10/21/19  0439 10/22/19  0530    138   K 4.4 5.4*   * 109   CO2 20* 20*   GLU 82 91   BUN 17 20   CREATININE 0.7 0.7   CALCIUM 9.0 9.3   PROT 5.3* 5.6*   ALBUMIN 2.6* 2.6*   BILITOT 0.5 0.6   ALKPHOS 76 81   AST 29 26   ALT 32 29   ANIONGAP 7* 9   EGFRNONAA >60 >60       Significant Imaging: I have reviewed all pertinent imaging results/findings within the past 24 hours.

## 2019-10-22 NOTE — ASSESSMENT & PLAN NOTE
- CTA of LUE yesterday showed highly comminuted displaced fracture of the proximal left humeral diaphysis with extension into the humeral head with avulsion of the greater tuberosity. Fat and complex glenohumeral joint effusion consistent with lipohemarthrosis.  Marked soft tissue swelling of the left shoulder and left upper extremity.  No evidence of vascular extravasation, occlusion or dissection.  Age indeterminate partially visualized compression fractures of the L3 and L5 vertebral bodies.   - Sling in use  - Orthopedic Surgery Consulted with recommendations for conservative treatment and no plans for surgical repair at this time  - Will need to continue sling and weekly xrays per ortho  - PRN analgesia  - Monitor

## 2019-10-22 NOTE — ASSESSMENT & PLAN NOTE
- Likely reactive in nature and r/t increased immobility, but will check CXR and UA to r/o acute process.   - IS if able to perform  - Encouraged ambulation  - Daily CBC

## 2019-10-22 NOTE — ASSESSMENT & PLAN NOTE
- Patient currently resides at [facility] in   - Fall was unwitnessed and patient was reportedly found on the floor  - PT/OT consulted  - Fall precautions  - Monitor

## 2019-10-22 NOTE — SIGNIFICANT EVENT
CXR shows possible developing PNA.  Given temp of 100 with mild elevation in WBCs, and reports of cough per mother, will start empiric Levaquin today and monitor.

## 2019-10-22 NOTE — ASSESSMENT & PLAN NOTE
- Anemia likely secondary to blood loss at Lt Humerus fracture site   - Hgb improved to 10.7 s/p 2 units PRBCs  - Monitor with daily CBC and transfuse as needed
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
## 2019-10-23 PROBLEM — R41.82 ALTERED MENTAL STATUS: Status: RESOLVED | Noted: 2019-10-19 | Resolved: 2019-10-23

## 2019-10-23 PROBLEM — D72.829 LEUKOCYTOSIS: Status: RESOLVED | Noted: 2019-10-22 | Resolved: 2019-10-23

## 2019-10-23 PROBLEM — E27.40 ADRENAL INSUFFICIENCY: Status: RESOLVED | Noted: 2019-10-21 | Resolved: 2019-10-23

## 2019-10-23 LAB
ALBUMIN SERPL BCP-MCNC: 2.1 G/DL (ref 3.5–5.2)
ALP SERPL-CCNC: 76 U/L (ref 55–135)
ALT SERPL W/O P-5'-P-CCNC: 21 U/L (ref 10–44)
ANION GAP SERPL CALC-SCNC: 10 MMOL/L (ref 8–16)
ANISOCYTOSIS BLD QL SMEAR: SLIGHT
AST SERPL-CCNC: 18 U/L (ref 10–40)
BASOPHILS # BLD AUTO: 0.01 K/UL (ref 0–0.2)
BASOPHILS NFR BLD: 0.1 % (ref 0–1.9)
BILIRUB SERPL-MCNC: 0.8 MG/DL (ref 0.1–1)
BUN SERPL-MCNC: 19 MG/DL (ref 6–20)
BURR CELLS BLD QL SMEAR: ABNORMAL
CALCIUM SERPL-MCNC: 9 MG/DL (ref 8.7–10.5)
CHLORIDE SERPL-SCNC: 108 MMOL/L (ref 95–110)
CO2 SERPL-SCNC: 22 MMOL/L (ref 23–29)
CREAT SERPL-MCNC: 0.7 MG/DL (ref 0.5–1.4)
DACRYOCYTES BLD QL SMEAR: ABNORMAL
DIFFERENTIAL METHOD: ABNORMAL
EOSINOPHIL # BLD AUTO: 0 K/UL (ref 0–0.5)
EOSINOPHIL NFR BLD: 0.1 % (ref 0–8)
ERYTHROCYTE [DISTWIDTH] IN BLOOD BY AUTOMATED COUNT: 15.2 % (ref 11.5–14.5)
EST. GFR  (AFRICAN AMERICAN): >60 ML/MIN/1.73 M^2
EST. GFR  (NON AFRICAN AMERICAN): >60 ML/MIN/1.73 M^2
GLUCOSE SERPL-MCNC: 86 MG/DL (ref 70–110)
HCT VFR BLD AUTO: 27.9 % (ref 37–48.5)
HGB BLD-MCNC: 8.9 G/DL (ref 12–16)
IMM GRANULOCYTES # BLD AUTO: 0.07 K/UL (ref 0–0.04)
IMM GRANULOCYTES NFR BLD AUTO: 0.8 % (ref 0–0.5)
LYMPHOCYTES # BLD AUTO: 1.7 K/UL (ref 1–4.8)
LYMPHOCYTES NFR BLD: 18.2 % (ref 18–48)
MCH RBC QN AUTO: 30.4 PG (ref 27–31)
MCHC RBC AUTO-ENTMCNC: 31.9 G/DL (ref 32–36)
MCV RBC AUTO: 95 FL (ref 82–98)
MONOCYTES # BLD AUTO: 1.1 K/UL (ref 0.3–1)
MONOCYTES NFR BLD: 11.7 % (ref 4–15)
NEUTROPHILS # BLD AUTO: 6.4 K/UL (ref 1.8–7.7)
NEUTROPHILS NFR BLD: 69.1 % (ref 38–73)
NRBC BLD-RTO: 0 /100 WBC
OVALOCYTES BLD QL SMEAR: ABNORMAL
PLATELET # BLD AUTO: 124 K/UL (ref 150–350)
PLATELET BLD QL SMEAR: ABNORMAL
PMV BLD AUTO: 10.4 FL (ref 9.2–12.9)
POIKILOCYTOSIS BLD QL SMEAR: SLIGHT
POLYCHROMASIA BLD QL SMEAR: ABNORMAL
POTASSIUM SERPL-SCNC: 4.1 MMOL/L (ref 3.5–5.1)
PROT SERPL-MCNC: 5.1 G/DL (ref 6–8.4)
RBC # BLD AUTO: 2.93 M/UL (ref 4–5.4)
SODIUM SERPL-SCNC: 140 MMOL/L (ref 136–145)
WBC # BLD AUTO: 9.3 K/UL (ref 3.9–12.7)

## 2019-10-23 PROCEDURE — 25000003 PHARM REV CODE 250: Performed by: NURSE PRACTITIONER

## 2019-10-23 PROCEDURE — 97530 THERAPEUTIC ACTIVITIES: CPT

## 2019-10-23 PROCEDURE — 94761 N-INVAS EAR/PLS OXIMETRY MLT: CPT

## 2019-10-23 PROCEDURE — 97110 THERAPEUTIC EXERCISES: CPT

## 2019-10-23 PROCEDURE — 80053 COMPREHEN METABOLIC PANEL: CPT

## 2019-10-23 PROCEDURE — 36415 COLL VENOUS BLD VENIPUNCTURE: CPT

## 2019-10-23 PROCEDURE — 63600175 PHARM REV CODE 636 W HCPCS: Performed by: PHYSICIAN ASSISTANT

## 2019-10-23 PROCEDURE — 63600175 PHARM REV CODE 636 W HCPCS: Performed by: NURSE PRACTITIONER

## 2019-10-23 PROCEDURE — 21400001 HC TELEMETRY ROOM

## 2019-10-23 PROCEDURE — 97116 GAIT TRAINING THERAPY: CPT

## 2019-10-23 PROCEDURE — 85025 COMPLETE CBC W/AUTO DIFF WBC: CPT

## 2019-10-23 PROCEDURE — 99900035 HC TECH TIME PER 15 MIN (STAT)

## 2019-10-23 RX ORDER — LEVOFLOXACIN 750 MG/1
750 TABLET ORAL DAILY
Qty: 3 TABLET | Refills: 0 | Status: SHIPPED | OUTPATIENT
Start: 2019-10-23 | End: 2019-10-23

## 2019-10-23 RX ORDER — ONDANSETRON 2 MG/ML
4 INJECTION INTRAMUSCULAR; INTRAVENOUS EVERY 8 HOURS PRN
Status: DISCONTINUED | OUTPATIENT
Start: 2019-10-23 | End: 2019-10-25 | Stop reason: HOSPADM

## 2019-10-23 RX ORDER — LEVOFLOXACIN 750 MG/1
750 TABLET ORAL DAILY
Qty: 3 TABLET | Refills: 0 | Status: SHIPPED | OUTPATIENT
Start: 2019-10-23 | End: 2019-10-25 | Stop reason: ALTCHOICE

## 2019-10-23 RX ORDER — CLONAZEPAM 1 MG/1
2 TABLET ORAL NIGHTLY PRN
Start: 2019-10-23

## 2019-10-23 RX ADMIN — ACETAMINOPHEN 650 MG: 325 TABLET ORAL at 09:10

## 2019-10-23 RX ADMIN — RISPERIDONE 1 MG: 1 TABLET ORAL at 09:10

## 2019-10-23 RX ADMIN — LEVOTHYROXINE SODIUM 75 MCG: 75 TABLET ORAL at 05:10

## 2019-10-23 RX ADMIN — DIVALPROEX SODIUM 500 MG: 500 TABLET, DELAYED RELEASE ORAL at 09:10

## 2019-10-23 RX ADMIN — TOPIRAMATE 50 MG: 25 TABLET, FILM COATED ORAL at 09:10

## 2019-10-23 RX ADMIN — BENZTROPINE MESYLATE 0.5 MG: 0.5 TABLET ORAL at 09:10

## 2019-10-23 RX ADMIN — ONDANSETRON 4 MG: 2 INJECTION INTRAMUSCULAR; INTRAVENOUS at 07:10

## 2019-10-23 RX ADMIN — CITALOPRAM HYDROBROMIDE 20 MG: 20 TABLET ORAL at 09:10

## 2019-10-23 RX ADMIN — LEVOFLOXACIN 750 MG: 750 TABLET, FILM COATED ORAL at 09:10

## 2019-10-23 RX ADMIN — QUETIAPINE 50 MG: 25 TABLET ORAL at 09:10

## 2019-10-23 NOTE — PROGRESS NOTES
Pt unable to perform IS, RA Sat 89-90%, NC 2lpm started, RN advised, family at bedside, will follow.

## 2019-10-23 NOTE — PLAN OF CARE
Met with patient and her mother. Reviewed list of hh providers. Preference letter obtained for [facility]. Referral faxed via [email or web address].         10/23/19 1022   Post-Acute Status   Post-Acute Authorization Home Health/Hospice   Home Health/Hospice Status Referrals Sent

## 2019-10-23 NOTE — PLAN OF CARE
Shift assessment completed.    Patient updated on POC for the day, denies pain, sob.   IVF infusing: IV SL  Neuro: A&O X2 self and place,  CVR: Continuous telemetry monitoring maintained, patient SR/ST with HR 80s-100s  Resp: O2 @ 2L , Sats= >92  GI: Pt is incontinent of stool and wears a brief.   : pt is incontinent of urine and wears a brief.   MS: pt walked with PT and is an assist up.   Skin: pt has bruising to her L arm due to fracture and is wearing a sling.   Reviewed fall precautions with patient and bed alarm on.

## 2019-10-23 NOTE — ASSESSMENT & PLAN NOTE
- Anemia likely secondary to blood loss at Lt Humerus fracture site   - Hematoma is stable with no enlargement noted and no active bleeding noted  - Hgb stable at 8.9 s/p 2 units PRBCs  - Monitor with daily CBC and transfuse as needed

## 2019-10-23 NOTE — SUBJECTIVE & OBJECTIVE
"Interval History:  No acute events overnight.  Resting comfortably in bed in NAD with mother at bedside.  The patient was medically cleared this morning to discharge home to the care of her mom with outpatient follow-up with Orthopedics for weekly x-rays and monitoring of left upper extremity fracture given that surgical intervention is not warranted at this time.  As of this afternoon, patient's mother is contesting her DC as she feels "she is not safe to discharge".  Awaiting review.      Review of Systems   Unable to perform ROS: Other (Non-verbal with a history of MR and Autism. )     Objective:     Vital Signs (Most Recent):  Temp: 98.3 °F (36.8 °C) (10/23/19 0827)  Pulse: 79 (10/23/19 1315)  Resp: 18 (10/23/19 1315)  BP: (!) 119/56 (10/23/19 1315)  SpO2: (!) 93 % (10/23/19 1315) Vital Signs (24h Range):  Temp:  [98.1 °F (36.7 °C)-100.3 °F (37.9 °C)] 98.3 °F (36.8 °C)  Pulse:  [] 79  Resp:  [16-20] 18  SpO2:  [89 %-97 %] 93 %  BP: (115-127)/(56-62) 119/56     Weight: 96.1 kg (211 lb 13.8 oz)  Body mass index is 38.75 kg/m².  No intake or output data in the 24 hours ending 10/23/19 1338   Physical Exam   Constitutional: She appears well-developed and well-nourished. No distress.   HENT:   Head: Normocephalic and atraumatic.   Mouth/Throat: Oropharynx is clear and moist. No oropharyngeal exudate.   Eyes: Pupils are equal, round, and reactive to light. Conjunctivae are normal. Right eye exhibits no discharge. Left eye exhibits no discharge.   Neck: Normal range of motion. Neck supple. No JVD present.   Cardiovascular: Normal rate, regular rhythm, normal heart sounds and intact distal pulses. Exam reveals no gallop and no friction rub.   No murmur heard.  Pulses:       Radial pulses are 2+ on the left side.   Pulmonary/Chest: Effort normal and breath sounds normal. No stridor. No respiratory distress. She has no wheezes. She has no rales. She exhibits no tenderness.   Abdominal: Soft. Bowel sounds are normal. " She exhibits no distension and no mass. There is no tenderness. There is no rebound and no guarding. No hernia.   Musculoskeletal: Normal range of motion. She exhibits edema (left upper arm ). She exhibits no tenderness or deformity.   Sling to Left arm  +2 Left radial pulse   Left arm warm    Neurological: She is alert.   Skin: Skin is warm and dry. Capillary refill takes less than 2 seconds. No rash noted. She is not diaphoretic. No erythema.   Generalized edema to LUE.  + ecchymosis to left anterior and posterior shoulder.  + hematoma to left anterior arm stable with no further expansion.   Psychiatric: She has a normal mood and affect. Her behavior is normal.   Nursing note and vitals reviewed.      Significant Labs:   BMP:   Recent Labs   Lab 10/23/19  0350   GLU 86      K 4.1      CO2 22*   BUN 19   CREATININE 0.7   CALCIUM 9.0     CBC:   Recent Labs   Lab 10/22/19  0530 10/23/19  0350   WBC 13.02* 9.30   HGB 10.7* 8.9*   HCT 32.6* 27.9*   * 124*       Significant Imaging: I have reviewed all pertinent imaging results/findings within the past 24 hours.

## 2019-10-23 NOTE — SIGNIFICANT EVENT
Further review of CTA LUE showed age indeterminate partially visualized compression fractures of the L3 and L5 vertebral bodies.  Plain xrays obtained today that showed multiple wedge deformities are suspected from L2 through L5 of unknown chronicity.  Patient able to move BLE without pain/difficulty and is ambulating in the hallway.  No bladder/bowel dysfunction appreciated.  Given recent fall, case d/w Dr. Stovall who recommended an MRI to evaluate further.  Per d/w patient's mom she can have an MRI, which pending.  Additional recs to follow MRI results.

## 2019-10-23 NOTE — ASSESSMENT & PLAN NOTE
"- CTA of LORI yesterday showed highly comminuted displaced fracture of the proximal left humeral diaphysis with extension into the humeral head with avulsion of the greater tuberosity. Fat and complex glenohumeral joint effusion consistent with lipohemarthrosis.  Marked soft tissue swelling of the left shoulder and left upper extremity.  No evidence of vascular extravasation, occlusion or dissection.  Age indeterminate partially visualized compression fractures of the L3 and L5 vertebral bodies.   - Sling in use  - Orthopedic Surgery Consulted with recommendations for conservative treatment and no plans for surgical repair at this time  - Will need to continue sling and weekly xrays per ortho  - PRN analgesia  - Monitor    As of 10/23 patient was medically cleared to DC home with her mom today and outpatient f/u with ortho. As of this afternoon, patient's mother is contesting her DC as she feels "she is not safe to discharge".  Awaiting review.        "

## 2019-10-23 NOTE — PLAN OF CARE
Call received  From patient's mother Telma Fofana stating that she has filed an appeal. Her case # is [id number]. Notified MAURILIO Mclain.

## 2019-10-23 NOTE — PLAN OF CARE
Patient awake, alert and oriented x self   Patient denies pain at this time.   PRN pain meds administered per MAR.  No other complaints voiced at this time.   Continue on telemetry monitoring.   Safety precautions remains in place.   Willi continue to monitor.
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
## 2019-10-23 NOTE — PLAN OF CARE
10/23/19 1240   Medicare Message   Important Message from Medicare regarding Discharge Appeal Rights Given to patient/caregiver;Explained to patient/caregiver;Signed/date by patient/caregiver   Date IMM was signed 10/23/19   Time IMM was signed 1240

## 2019-10-23 NOTE — PLAN OF CARE
PATIENT REQUIRE EXTENDED TIME TO PROCESS ONE STEP COMMANDS. MOD ASSIST X2 FOR T/FS, STEP GT ACTIVITY TO B/S CHAIR AT AX2 FOR SAFETY.

## 2019-10-23 NOTE — PT/OT/SLP PROGRESS
Physical Therapy  Treatment    Suzie Fofana   MRN: [id number]   Admitting Diagnosis: Altered mental status    PT Received On: 10/23/19  PT Start Time: 1040     PT Stop Time: 1120    PT Total Time (min): 40 min       Billable Minutes:  Gait Training 15, Therapeutic Activity 15 and Therapeutic Exercise 10    Treatment Type: Treatment  PT/PTA: PTA     PTA Visit Number: 1       General Precautions: Standard, fall  Orthopedic Precautions: LUE non weight bearing   Braces: UE Sling    Spiritual, Cultural Beliefs, Taoism Practices, Values that Affect Care: no    Subjective:  Communicated with NURSE, LASHAE AND [facility] CHART REVIEW  prior to session.  PATIENT AGREEABLE TO TX NOW.    Pain/Comfort  Pain Rating 1: 2/10  Location - Side 1: Left  Location - Orientation 1: upper  Location 1: shoulder  Pain Addressed 1: Pre-medicate for activity, Cessation of Activity, Reposition, Distraction  Pain Rating Post-Intervention 1: 2/10    Objective:   Patient found with: peripheral IV, telemetry, bed alarm ON ASLEEP. PATIENT EASILY AWAKENED FOR TX .   ASSISTED PATIENT WITH UE SLING PLACEMENT AS WAS LOOSE.   ASSISTED PATIENT WITH SHORTSEATED BALANCE ACTIVITY AT NWB TO LUE.   OOB T/FS TO B/S CHAIR, STEP GT ACTIVITY AT B/S.  PATIENT REQUIRES EXTENDED TIME FOR MOBILITY FOLLOW THROUGH AND ONE STEP COMMANDS   PATIENT IV NOTED TO BE BLEEDING , NURSE NOTIFIED, PRESENT NOW.  Functional Mobility:  Bed Mobility:    SUPINE TO SIT AT MOD ASSIST X1-2 FOR SAFETY.    Transfers:   SIT TO STAND , STAND TO SIT AT MIN ASSIST X2 FOR SAFETY.    Gait:    SEVERAL STEPS AT B/S AT MIN TO MOD ASSIST X2 FOR SAFETY. PATIENT UNSTEADY ON HER FEET .    Stairs:  N/A    Balance:   Static Sit: FAIR-: Maintains without assist but inconsistent   Dynamic Sit: FAIR: Cannot move trunk without losing balance  Static Stand: POOR: Needs MODERATE assist to maintain  Dynamic stand: POOR: Needs MOD (moderate) assist during gait     Therapeutic Activities and Exercises:  OOB T/FS  AT NWB TO LUE, GT STEP ACTIVITY, T/FS TO B/S CHAIR.    AM-PAC 6 CLICK MOBILITY  How much help from another person does this patient currently need?   1 = Unable, Total/Dependent Assistance  2 = A lot, Maximum/Moderate Assistance  3 = A little, Minimum/Contact Guard/Supervision  4 = None, Modified [place]/Independent    Turning over in bed (including adjusting bedclothes, sheets and blankets)?: 2  Sitting down on and standing up from a chair with arms (e.g., wheelchair, bedside commode, etc.): 2  Moving from lying on back to sitting on the side of the bed?: 2  Moving to and from a bed to a chair (including a wheelchair)?: 2  Need to walk in hospital room?: 2  Climbing 3-5 steps with a railing?: 1  Basic Mobility Total Score: 11    AM-PAC Raw Score CMS G-Code Modifier Level of Impairment Assistance   6 % Total / Unable   7 - 9 CM 80 - 100% Maximal Assist   10 - 14 CL 60 - 80% Moderate Assist   15 - 19 CK 40 - 60% Moderate Assist   20 - 22 CJ 20 - 40% Minimal Assist   23 CI 1-20% SBA / CGA   24 CH 0% Independent/ Mod I     Patient left up in chair with all lines intact, call button in reach and chair alarm on, NURSE AWARE.     Assessment:  Suzie Fofana is a 44 y.o. female with a medical diagnosis of Altered mental status . PATIENT REQUIRES ONE STEP COMMANDS, EXTENDED TIME TO PROCESS REQUEST. PATIENT REQUIRE MIN TO MOD ASSIST X1-2 FOR ALL THERAPUETIC ACTIVITIES TODAY.PATIENT COOPERATIVE WITH ALL THERAPUETIC ACTIVITIES. PATIENT MOTHER PRESENT , SUPPORTIVE WITH PATIENT CARE.    Rehab identified problem list/impairments: Rehab identified problem list/impairments: weakness, impaired self care skills, impaired balance, decreased coordination, decreased ROM, pain, decreased upper extremity function, impaired functional mobilty, impaired endurance, gait instability    Rehab potential is good.    Activity tolerance: Good    Discharge recommendations: Discharge Facility/Level of Care Needs: home health PT,  outpatient PT, nursing facility, skilled, rehabilitation facility     Barriers to discharge:      Equipment recommendations:       GOALS:   Multidisciplinary Problems     Physical Therapy Goals        Problem: Physical Therapy Goal    Goal Priority Disciplines Outcome Goal Variances Interventions   Physical Therapy Goal     PT, PT/OT      Description:  LTGs to be met by 10/29/19  1. Pt will perform bed mobility with Supervision  2. Pt will perform sit<>stand functional t/fs with Supervision  3. Pt will ambulate ~200ft without AD with Supervision  4. Pt will perform (B) LE therapeutic exercises 1 x 20 reps                    PLAN:    Patient to be seen 5 x/week  to address the above listed problems via gait training, therapeutic activities, therapeutic exercises  Plan of Care expires:    Plan of Care reviewed with: patient, mother         Pooja Pedroza, PTA  10/23/2019
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
## 2019-10-24 PROBLEM — D69.6 THROMBOCYTOPENIA: Status: RESOLVED | Noted: 2019-10-19 | Resolved: 2019-10-24

## 2019-10-24 PROBLEM — S32.000A COMPRESSION FRACTURE OF LUMBAR VERTEBRA: Status: ACTIVE | Noted: 2019-10-24

## 2019-10-24 LAB
ALBUMIN SERPL BCP-MCNC: 2 G/DL (ref 3.5–5.2)
ALP SERPL-CCNC: 87 U/L (ref 55–135)
ALT SERPL W/O P-5'-P-CCNC: 26 U/L (ref 10–44)
ANION GAP SERPL CALC-SCNC: 9 MMOL/L (ref 8–16)
ANISOCYTOSIS BLD QL SMEAR: SLIGHT
AST SERPL-CCNC: 25 U/L (ref 10–40)
BASOPHILS # BLD AUTO: 0.03 K/UL (ref 0–0.2)
BASOPHILS NFR BLD: 0.4 % (ref 0–1.9)
BILIRUB SERPL-MCNC: 1 MG/DL (ref 0.1–1)
BLD PROD TYP BPU: NORMAL
BLD PROD TYP BPU: NORMAL
BLOOD UNIT EXPIRATION DATE: NORMAL
BLOOD UNIT EXPIRATION DATE: NORMAL
BLOOD UNIT TYPE CODE: 6200
BLOOD UNIT TYPE CODE: 6200
BLOOD UNIT TYPE: NORMAL
BLOOD UNIT TYPE: NORMAL
BUN SERPL-MCNC: 18 MG/DL (ref 6–20)
CALCIUM SERPL-MCNC: 9.2 MG/DL (ref 8.7–10.5)
CHLORIDE SERPL-SCNC: 111 MMOL/L (ref 95–110)
CO2 SERPL-SCNC: 21 MMOL/L (ref 23–29)
CODING SYSTEM: NORMAL
CODING SYSTEM: NORMAL
CREAT SERPL-MCNC: 0.7 MG/DL (ref 0.5–1.4)
DACRYOCYTES BLD QL SMEAR: ABNORMAL
DIFFERENTIAL METHOD: ABNORMAL
DISPENSE STATUS: NORMAL
DISPENSE STATUS: NORMAL
EOSINOPHIL # BLD AUTO: 0.1 K/UL (ref 0–0.5)
EOSINOPHIL NFR BLD: 1.2 % (ref 0–8)
ERYTHROCYTE [DISTWIDTH] IN BLOOD BY AUTOMATED COUNT: 15.1 % (ref 11.5–14.5)
EST. GFR  (AFRICAN AMERICAN): >60 ML/MIN/1.73 M^2
EST. GFR  (NON AFRICAN AMERICAN): >60 ML/MIN/1.73 M^2
GLUCOSE SERPL-MCNC: 81 MG/DL (ref 70–110)
HCT VFR BLD AUTO: 27.9 % (ref 37–48.5)
HGB BLD-MCNC: 9.1 G/DL (ref 12–16)
IMM GRANULOCYTES # BLD AUTO: 0.14 K/UL (ref 0–0.04)
IMM GRANULOCYTES NFR BLD AUTO: 1.8 % (ref 0–0.5)
LYMPHOCYTES # BLD AUTO: 1.5 K/UL (ref 1–4.8)
LYMPHOCYTES NFR BLD: 19.9 % (ref 18–48)
MCH RBC QN AUTO: 31.2 PG (ref 27–31)
MCHC RBC AUTO-ENTMCNC: 32.6 G/DL (ref 32–36)
MCV RBC AUTO: 96 FL (ref 82–98)
MONOCYTES # BLD AUTO: 0.9 K/UL (ref 0.3–1)
MONOCYTES NFR BLD: 11.6 % (ref 4–15)
NEUTROPHILS # BLD AUTO: 5 K/UL (ref 1.8–7.7)
NEUTROPHILS NFR BLD: 65.1 % (ref 38–73)
NRBC BLD-RTO: 1 /100 WBC
NUM UNITS TRANS PACKED RBC: NORMAL
NUM UNITS TRANS PACKED RBC: NORMAL
PLATELET # BLD AUTO: 171 K/UL (ref 150–350)
PLATELET BLD QL SMEAR: ABNORMAL
PMV BLD AUTO: 10.4 FL (ref 9.2–12.9)
POIKILOCYTOSIS BLD QL SMEAR: SLIGHT
POLYCHROMASIA BLD QL SMEAR: ABNORMAL
POTASSIUM SERPL-SCNC: 4.1 MMOL/L (ref 3.5–5.1)
PROT SERPL-MCNC: 5.4 G/DL (ref 6–8.4)
RBC # BLD AUTO: 2.92 M/UL (ref 4–5.4)
SODIUM SERPL-SCNC: 141 MMOL/L (ref 136–145)
WBC # BLD AUTO: 7.68 K/UL (ref 3.9–12.7)

## 2019-10-24 PROCEDURE — 80053 COMPREHEN METABOLIC PANEL: CPT

## 2019-10-24 PROCEDURE — 63600175 PHARM REV CODE 636 W HCPCS: Performed by: NURSE PRACTITIONER

## 2019-10-24 PROCEDURE — 25000003 PHARM REV CODE 250: Performed by: NURSE PRACTITIONER

## 2019-10-24 PROCEDURE — 36415 COLL VENOUS BLD VENIPUNCTURE: CPT

## 2019-10-24 PROCEDURE — 85025 COMPLETE CBC W/AUTO DIFF WBC: CPT

## 2019-10-24 PROCEDURE — 21400001 HC TELEMETRY ROOM

## 2019-10-24 PROCEDURE — 99900035 HC TECH TIME PER 15 MIN (STAT)

## 2019-10-24 PROCEDURE — 97116 GAIT TRAINING THERAPY: CPT

## 2019-10-24 PROCEDURE — 97530 THERAPEUTIC ACTIVITIES: CPT

## 2019-10-24 RX ADMIN — LEVOFLOXACIN 750 MG: 750 TABLET, FILM COATED ORAL at 08:10

## 2019-10-24 RX ADMIN — BENZTROPINE MESYLATE 0.5 MG: 0.5 TABLET ORAL at 11:10

## 2019-10-24 RX ADMIN — TOPIRAMATE 50 MG: 25 TABLET, FILM COATED ORAL at 08:10

## 2019-10-24 RX ADMIN — RISPERIDONE 0.5 MG: 0.5 TABLET ORAL at 08:10

## 2019-10-24 RX ADMIN — DIVALPROEX SODIUM 500 MG: 500 TABLET, DELAYED RELEASE ORAL at 11:10

## 2019-10-24 RX ADMIN — RISPERIDONE 1 MG: 1 TABLET ORAL at 11:10

## 2019-10-24 RX ADMIN — LEVOTHYROXINE SODIUM 75 MCG: 75 TABLET ORAL at 06:10

## 2019-10-24 RX ADMIN — DIVALPROEX SODIUM 500 MG: 500 TABLET, DELAYED RELEASE ORAL at 08:10

## 2019-10-24 RX ADMIN — RISPERIDONE 0.5 MG: 0.5 TABLET ORAL at 11:10

## 2019-10-24 RX ADMIN — QUETIAPINE 50 MG: 25 TABLET ORAL at 08:10

## 2019-10-24 RX ADMIN — ACETAMINOPHEN 650 MG: 325 TABLET ORAL at 11:10

## 2019-10-24 RX ADMIN — TOPIRAMATE 50 MG: 25 TABLET, FILM COATED ORAL at 11:10

## 2019-10-24 RX ADMIN — BENZTROPINE MESYLATE 0.5 MG: 0.5 TABLET ORAL at 08:10

## 2019-10-24 RX ADMIN — CITALOPRAM HYDROBROMIDE 20 MG: 20 TABLET ORAL at 08:10

## 2019-10-24 NOTE — SUBJECTIVE & OBJECTIVE
Interval History: .  No acute events overnight.  Currently sitting up in bedside chair with mother at bedside in NAD.   MRI of back showed multiple chronic compression fractures of the lumbar spine may relate to underlying metabolic bone disease/osteoporosis.  No MRI evidence of acute fracture.  Patient was evaluated by neurosurgeon Dr. Stovall who recommended to continue conservative treatment with no surgical intervention required at this time.  Per d/w CM, patient lost DC appeal and must DC by noon tomorrow.  Detailed discussion held with the patient's mother who is present at the bedside and she is aware of the need to discharge before noon tomorrow.  DC orders placed yesterday will remain. As of now patient will DC home to the care of her family and [facility] for continued PT/OT and nursing visits.  LUE hematoma/ecchymosis remains stable with no enlargement and some improvement.  No active s/s of bleeding noted.  Hgb stable at 9.1.  Sling to LUE in use.  Extremity is warm to touch with cap refill <3 seconds.  Per d/w Dr. Lange patient will need to f/u in ortho clinic weekly for xrays and application of a coaptation splint once edema resolves.     Review of Systems   Unable to perform ROS: Other (Non-verbal with a history of MR and Autism. )     Objective:     Vital Signs (Most Recent):  Temp: 98.4 °F (36.9 °C) (10/24/19 0755)  Pulse: 86 (10/24/19 1532)  Resp: 18 (10/24/19 1228)  BP: (!) 110/58 (10/24/19 1228)  SpO2: 95 % (10/24/19 1228) Vital Signs (24h Range):  Temp:  [97.3 °F (36.3 °C)-99 °F (37.2 °C)] 98.4 °F (36.9 °C)  Pulse:  [70-86] 86  Resp:  [18] 18  SpO2:  [84 %-100 %] 95 %  BP: (105-127)/(51-85) 110/58     Weight: 94.3 kg (207 lb 14.3 oz)  Body mass index is 38.02 kg/m².    Intake/Output Summary (Last 24 hours) at 10/24/2019 1641  Last data filed at 10/24/2019 0500  Gross per 24 hour   Intake 238 ml   Output 600 ml   Net -362 ml      Physical Exam   Constitutional: She appears well-developed  and well-nourished. No distress.   HENT:   Head: Normocephalic and atraumatic.   Mouth/Throat: Oropharynx is clear and moist. No oropharyngeal exudate.   Eyes: Pupils are equal, round, and reactive to light. Conjunctivae are normal. Right eye exhibits no discharge. Left eye exhibits no discharge.   Neck: Normal range of motion. Neck supple. No JVD present.   Cardiovascular: Normal rate, regular rhythm, normal heart sounds and intact distal pulses. Exam reveals no gallop and no friction rub.   No murmur heard.  Pulses:       Radial pulses are 2+ on the left side.   Pulmonary/Chest: Effort normal and breath sounds normal. No stridor. No respiratory distress. She has no wheezes. She has no rales. She exhibits no tenderness.   Abdominal: Soft. Bowel sounds are normal. She exhibits no distension and no mass. There is no tenderness. There is no rebound and no guarding. No hernia.   Musculoskeletal: Normal range of motion. She exhibits edema (left upper arm ). She exhibits no tenderness or deformity.   Sling to Left arm  +2 Left radial pulse   Left arm warm    Neurological: She is alert.   Skin: Skin is warm and dry. Capillary refill takes less than 2 seconds. No rash noted. She is not diaphoretic. No erythema.   Generalized edema to LUE.  + ecchymosis to left anterior and posterior shoulder.  + hematoma to left anterior arm stable with no further expansion.   Psychiatric: She has a normal mood and affect. Her behavior is normal.   Nursing note and vitals reviewed.      Significant Labs:   BMP:   Recent Labs   Lab 10/24/19  0402   GLU 81      K 4.1   *   CO2 21*   BUN 18   CREATININE 0.7   CALCIUM 9.2     CBC:   Recent Labs   Lab 10/23/19  0350 10/24/19  0402   WBC 9.30 7.68   HGB 8.9* 9.1*   HCT 27.9* 27.9*   * 171       Significant Imaging: I have reviewed all pertinent imaging results/findings within the past 24 hours.
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
## 2019-10-24 NOTE — ASSESSMENT & PLAN NOTE
- MRI of back showed multiple chronic compression fractures of the lumbar spine may relate to underlying metabolic bone disease/osteoporosis.  No MRI evidence of acute fracture.    - Evaluated by neurosurgeon Dr. Stovall who recommended to continue conservative treatment with no surgical intervention required at this time.

## 2019-10-24 NOTE — PLAN OF CARE
Patient denies pain at this time.   PRN pain meds administered per MAR.  Skin remains free from breakdown.   IV site patent and intact, no redness.  No other complaints voiced at this time.   Continue on telemetry monitoring.   Safety precautions remains in place.   Willi continue to monitor.

## 2019-10-24 NOTE — PLAN OF CARE
Call received from [facility] nurse, patient's mother informed him that she needed a referral faxed for her to [facility] for placement. She told him that patient lost her appeal and will need to vacate by noon tomorrow. I discussed with Vincent that [employer] patient advocate Michelle Murphy is working with the patient and her mother to locate another group home to accept patient.  Discussed with Vincent that CARLITO discussed with patient's mother that RALEIGH Shields with [facility] [place] [facility] informed me that patient could either discharge to a group home or with family and wait placement as long as she was medically stable. Patient's mother is working with [employer] to seek other group home. Patient is to discharge to her sister's home with [facility].         10/24/19 3526   Post-Acute Status   Post-Acute Authorization Other   Discharge Delays (!) Patient and Family Barriers
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
## 2019-10-24 NOTE — ASSESSMENT & PLAN NOTE
- CTA of LORI yesterday showed highly comminuted displaced fracture of the proximal left humeral diaphysis with extension into the humeral head with avulsion of the greater tuberosity. Fat and complex glenohumeral joint effusion consistent with lipohemarthrosis.  Marked soft tissue swelling of the left shoulder and left upper extremity.  No evidence of vascular extravasation, occlusion or dissection.  Age indeterminate partially visualized compression fractures of the L3 and L5 vertebral bodies.   - Sling in use  - Orthopedic Surgery Consulted with recommendations for conservative treatment and no plans for surgical repair at this time  - Will need to continue sling and weekly xrays per ortho  - PRN analgesia  - Monitor  - Patient lost DC appeal and must DC by noon tomorrow, DC orders remain in place

## 2019-10-24 NOTE — PT/OT/SLP PROGRESS
Physical Therapy  Treatment    Suzie Fofana   MRN: [id number]   Admitting Diagnosis: Altered mental status    PT Received On: 10/24/19  PT Start Time: 1415     PT Stop Time: 1455    PT Total Time (min): 40 min       Billable Minutes:  Gait Training 15 and Therapeutic Activity 25    Treatment Type: Treatment  PT/PTA: PTA     PTA Visit Number: 2       General Precautions: Standard, fall  Orthopedic Precautions: LUE non weight bearing   Braces: UE Sling    Spiritual, Cultural Beliefs, Methodist Practices, Values that Affect Care: no    Subjective:  Communicated with NURSE, LASHAE AND [facility] CHART REVIEW  prior to session.  PATIENT AGREE TO TX NOW.    Pain/Comfort  Pain Rating 1: 4/10(GRIMMACING NOTED AT TIMES, PATIENT DOES NOT COMPLAIN.)  Location - Side 1: Left  Location - Orientation 1: upper  Location 1: arm  Pain Addressed 1: Pre-medicate for activity, Cessation of Activity, Reposition, Distraction  Pain Rating Post-Intervention 1: 4/10    Objective:   Patient found with: peripheral IV, telemetry, bed alarm , SUPINE INBED . ASSISTED PATIENT WITH SHORTSEATED BALANCE ACTIVITY, ADJUSTED UE SLING, OOB T/FS TO B/S CHAIR NWB LUE, SEVERAL STEPS MADE AT B/S .   Functional Mobility:  Bed Mobility:    SUPINE TO SIT  AT MIN TO MOD ASSISTX1.    Transfers:   SIT TO STAND, STAND TO SIT AT MIN TO MOD ASSISTX2.    Gait:    SEVERAL STEPS TAKEN TO BEDSIDE CHAIR AT MOD ASSIST X2 . PATIENT UNSTEADY ON HER FEET.    Stairs:  N/A    Balance:   Static Sit: FAIR: Maintains without assist, but unable to take any challenges   Dynamic Sit: FAIR: Cannot move trunk without losing balance  Static Stand: POOR: Needs MODERATE assist to maintain  Dynamic stand: POOR: Needs MOD (moderate) assist during gait     Therapeutic Activities and Exercises:  OOB T/FS TO B/S CHAIR AT NWB LUE STEP GT ACTIVITY  .    AM-PAC 6 CLICK MOBILITY  How much help from another person does this patient currently need?   1 = Unable, Total/Dependent Assistance  2 = A lot,  Maximum/Moderate Assistance  3 = A little, Minimum/Contact Guard/Supervision  4 = None, Modified [place]/Independent    Turning over in bed (including adjusting bedclothes, sheets and blankets)?: 2  Sitting down on and standing up from a chair with arms (e.g., wheelchair, bedside commode, etc.): 2  Moving from lying on back to sitting on the side of the bed?: 2  Moving to and from a bed to a chair (including a wheelchair)?: 2  Need to walk in hospital room?: 2  Climbing 3-5 steps with a railing?: 1  Basic Mobility Total Score: 11    AM-PAC Raw Score CMS G-Code Modifier Level of Impairment Assistance   6 % Total / Unable   7 - 9 CM 80 - 100% Maximal Assist   10 - 14 CL 60 - 80% Moderate Assist   15 - 19 CK 40 - 60% Moderate Assist   20 - 22 CJ 20 - 40% Minimal Assist   23 CI 1-20% SBA / CGA   24 CH 0% Independent/ Mod I     Patient left up in chair with all lines intact, call button in reach and chair alarm on ,MOTHER PRESENT. PATIENT ASSISTED WITH LUNCH SET UP.    Assessment:  Suzie Fofana is a 44 y.o. female with a medical diagnosis of Altered mental status . PATIENT COOPERATIVE WITH ALL THERAPUETIC ACTIVITIES. PATIENT WITH NOTED UNSTEADINESS DURING STANDING STEP BALANCE ACTIVITY. FAMILY PRESENT ,SUPPORTIVE WITH PATIENT CARE.    Rehab identified problem list/impairments: Rehab identified problem list/impairments: weakness, impaired self care skills, impaired balance, decreased coordination, pain, decreased upper extremity function, gait instability, impaired sensation, impaired endurance, impaired functional mobilty    Rehab potential is good.    Activity tolerance: Good    Discharge recommendations: Discharge Facility/Level of Care Needs: home health PT, outpatient PT, nursing facility, skilled, rehabilitation facility     Barriers to discharge:      Equipment recommendations:       GOALS:   Multidisciplinary Problems     Physical Therapy Goals        Problem: Physical Therapy Goal    Goal Priority  Disciplines Outcome Goal Variances Interventions   Physical Therapy Goal     PT, PT/OT      Description:  LTGs to be met by 10/29/19  1. Pt will perform bed mobility with Supervision  2. Pt will perform sit<>stand functional t/fs with Supervision  3. Pt will ambulate ~200ft without AD with Supervision  4. Pt will perform (B) LE therapeutic exercises 1 x 20 reps                    PLAN:    Patient to be seen 5 x/week  to address the above listed problems via gait training, therapeutic activities, therapeutic exercises  Plan of Care expires: 10/29/19  Plan of Care reviewed with: patient, mother         Pooja Pedroza, PTA  10/24/2019

## 2019-10-24 NOTE — ASSESSMENT & PLAN NOTE
- Anemia likely secondary to blood loss at Lt Humerus fracture site   - Hematoma is stable with no enlargement noted and no active bleeding noted  - Hgb stable at 9.1 s/p 2 units PRBCs  - Monitor with daily CBC and transfuse as needed

## 2019-10-25 VITALS
SYSTOLIC BLOOD PRESSURE: 115 MMHG | BODY MASS INDEX: 38.67 KG/M2 | TEMPERATURE: 98 F | HEIGHT: 62 IN | WEIGHT: 210.13 LBS | DIASTOLIC BLOOD PRESSURE: 68 MMHG | HEART RATE: 87 BPM | RESPIRATION RATE: 18 BRPM | OXYGEN SATURATION: 98 %

## 2019-10-25 PROCEDURE — 25000003 PHARM REV CODE 250: Performed by: NURSE PRACTITIONER

## 2019-10-25 PROCEDURE — 63600175 PHARM REV CODE 636 W HCPCS: Performed by: NURSE PRACTITIONER

## 2019-10-25 PROCEDURE — 25000003 PHARM REV CODE 250: Performed by: INTERNAL MEDICINE

## 2019-10-25 PROCEDURE — 99900035 HC TECH TIME PER 15 MIN (STAT)

## 2019-10-25 RX ORDER — DIVALPROEX SODIUM 125 MG/1
500 TABLET, DELAYED RELEASE ORAL EVERY 12 HOURS
Qty: 240 TABLET | Refills: 1 | Status: SHIPPED | OUTPATIENT
Start: 2019-10-25 | End: 2019-12-24

## 2019-10-25 RX ORDER — BENZTROPINE MESYLATE 0.5 MG/1
0.5 TABLET ORAL 2 TIMES DAILY
Qty: 60 TABLET | Refills: 1 | Status: SHIPPED | OUTPATIENT
Start: 2019-10-25 | End: 2019-11-24

## 2019-10-25 RX ORDER — MUPIROCIN 20 MG/G
OINTMENT TOPICAL 3 TIMES DAILY
Qty: 15 G | Refills: 0 | Status: SHIPPED | OUTPATIENT
Start: 2019-10-25

## 2019-10-25 RX ORDER — L. ACIDOPHILUS/L.BULGARICUS 100MM CELL
1 GRANULES IN PACKET (EA) ORAL 2 TIMES DAILY
Qty: 60 PACKET | Refills: 1 | Status: SHIPPED | OUTPATIENT
Start: 2019-10-25 | End: 2019-12-24

## 2019-10-25 RX ORDER — DIPHENHYDRAMINE HCL 25 MG
25 CAPSULE ORAL ONCE
Status: COMPLETED | OUTPATIENT
Start: 2019-10-25 | End: 2019-10-25

## 2019-10-25 RX ORDER — QUETIAPINE FUMARATE 50 MG/1
50 TABLET, FILM COATED ORAL DAILY
Qty: 30 TABLET | Refills: 1 | Status: SHIPPED | OUTPATIENT
Start: 2019-10-25 | End: 2019-11-24

## 2019-10-25 RX ORDER — CITALOPRAM 20 MG/1
20 TABLET, FILM COATED ORAL DAILY
Qty: 30 TABLET | Refills: 1 | Status: SHIPPED | OUTPATIENT
Start: 2019-10-25 | End: 2019-11-24

## 2019-10-25 RX ORDER — MUPIROCIN 20 MG/G
OINTMENT TOPICAL 3 TIMES DAILY
Status: DISCONTINUED | OUTPATIENT
Start: 2019-10-25 | End: 2019-10-25

## 2019-10-25 RX ORDER — DOXYCYCLINE 100 MG/1
100 CAPSULE ORAL EVERY 12 HOURS
Qty: 14 CAPSULE | Refills: 0 | Status: SHIPPED | OUTPATIENT
Start: 2019-10-25 | End: 2019-11-01

## 2019-10-25 RX ORDER — MUPIROCIN 20 MG/G
OINTMENT TOPICAL 3 TIMES DAILY
Status: DISCONTINUED | OUTPATIENT
Start: 2019-10-25 | End: 2019-10-25 | Stop reason: HOSPADM

## 2019-10-25 RX ORDER — RISPERIDONE 0.5 MG/1
0.5 TABLET ORAL 2 TIMES DAILY
Qty: 60 TABLET | Refills: 1 | Status: SHIPPED | OUTPATIENT
Start: 2019-10-25 | End: 2019-11-24

## 2019-10-25 RX ADMIN — MUPIROCIN: 20 OINTMENT TOPICAL at 03:10

## 2019-10-25 RX ADMIN — RISPERIDONE 0.5 MG: 0.5 TABLET ORAL at 08:10

## 2019-10-25 RX ADMIN — QUETIAPINE 50 MG: 25 TABLET ORAL at 08:10

## 2019-10-25 RX ADMIN — LEVOFLOXACIN 750 MG: 750 TABLET, FILM COATED ORAL at 08:10

## 2019-10-25 RX ADMIN — TOPIRAMATE 50 MG: 25 TABLET, FILM COATED ORAL at 08:10

## 2019-10-25 RX ADMIN — DIPHENHYDRAMINE HYDROCHLORIDE 25 MG: 25 CAPSULE ORAL at 08:10

## 2019-10-25 RX ADMIN — BENZTROPINE MESYLATE 0.5 MG: 0.5 TABLET ORAL at 08:10

## 2019-10-25 RX ADMIN — CITALOPRAM HYDROBROMIDE 20 MG: 20 TABLET ORAL at 08:10

## 2019-10-25 RX ADMIN — DIVALPROEX SODIUM 500 MG: 500 TABLET, DELAYED RELEASE ORAL at 08:10

## 2019-10-25 RX ADMIN — LEVOTHYROXINE SODIUM 75 MCG: 75 TABLET ORAL at 06:10

## 2019-10-25 NOTE — PLAN OF CARE
Dx fall. Fx lt arm  Poc instructedon dbc 10 x q1h. Pt repositioned q2h. Diaper removed. Pt assisted to br. Up in chair this am. Pt iv inadvertently removed puss noted to site. Cleansed . Notified anthony coon. Bactroban ordered, mepilex to site. Barrier cream applied to buttocks. Instructed on fall risk and precautions. Bed alarm on. Water, phone, and call light in reach. Pt to go home today.

## 2019-10-25 NOTE — PROGRESS NOTES
Connie ham np notified of rt ac site noted with puss and redness. Will order bactroban to be applied per orders.

## 2019-10-25 NOTE — PROGRESS NOTES
Pt up to br. Reapplied sling properly. Denies n/v to fingers. Bruising to lt arm with swelling. Arm is larger . Able to move fingers. Bruising to lateral back. Mother at bedside. Pt back it bed. Iv noted out. Cath intact. Puss noted to site. Mild redness moist. Cleansed with saline. Will notify md.

## 2019-10-25 NOTE — PROGRESS NOTES
Pt was up to chair. Pt to br then back to bed. Lying on rt side. Antibiotic ointment applied to rt ac site with puss after cleansing with stertile saline. mepilex applied. Tolerated. Bed alarm on. Call light in reach.

## 2019-10-25 NOTE — PLAN OF CARE
10/25/19 1510   Final Note   Assessment Type Final Discharge Note   Anticipated Discharge Disposition Home-Health   Right Care Referral Info   Post Acute Recommendation Home-care   Facility Name [facility]
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
## 2019-10-25 NOTE — NURSING
Went over discharge instructions with patient's mother.   Stressed importance of making and keeping all follow ups as well as making prescribed medication changes.   Paper prescriptions given to pt.  Gita previously delivered to bedside.  Mother verbalized understanding and has had all questions in regards to discharge answered.    Telemetry box removed and returned to monitor tech.  Patient awaiting morning meds to be administered by primary nurse, instructed to call nurse station once meds are given for wheelchair transport.    Primary nurse notified of pt's discharge status.

## 2019-10-25 NOTE — NURSING
During report night nurse observed patient IV coming out. Once removed visable rash around IV site and opening draining pus from insertion site. Night nurse followed up for skin care orders see MAR.

## 2019-10-25 NOTE — NURSING
Right arm dressed with dressing. Removed dressing redness visible and ointment had been applied this AM by night nurse. No actual draining of pus at this time.